# Patient Record
Sex: FEMALE | Race: WHITE | NOT HISPANIC OR LATINO | Employment: FULL TIME | URBAN - METROPOLITAN AREA
[De-identification: names, ages, dates, MRNs, and addresses within clinical notes are randomized per-mention and may not be internally consistent; named-entity substitution may affect disease eponyms.]

---

## 2017-06-29 ENCOUNTER — TRANSCRIBE ORDERS (OUTPATIENT)
Dept: ADMINISTRATIVE | Facility: HOSPITAL | Age: 62
End: 2017-06-29

## 2017-06-29 DIAGNOSIS — R60.9 EDEMA, UNSPECIFIED TYPE: ICD-10-CM

## 2017-06-29 DIAGNOSIS — M79.672 LEFT FOOT PAIN: Primary | ICD-10-CM

## 2017-07-05 ENCOUNTER — HOSPITAL ENCOUNTER (OUTPATIENT)
Dept: RADIOLOGY | Age: 62
Discharge: HOME | End: 2017-07-05
Payer: COMMERCIAL

## 2017-07-05 DIAGNOSIS — R60.9 EDEMA, UNSPECIFIED TYPE: ICD-10-CM

## 2017-07-05 DIAGNOSIS — M79.672 LEFT FOOT PAIN: ICD-10-CM

## 2017-07-05 PROCEDURE — 73718 MRI LOWER EXTREMITY W/O DYE: CPT

## 2017-11-16 ENCOUNTER — TRANSCRIBE ORDERS (OUTPATIENT)
Dept: ADMINISTRATIVE | Facility: HOSPITAL | Age: 62
End: 2017-11-16

## 2017-11-16 DIAGNOSIS — M85.80 OSTEOPENIA, UNSPECIFIED LOCATION: Primary | ICD-10-CM

## 2017-11-16 DIAGNOSIS — Z12.39 SCREENING BREAST EXAMINATION: Primary | ICD-10-CM

## 2017-11-20 ENCOUNTER — HOSPITAL ENCOUNTER (OUTPATIENT)
Dept: RADIOLOGY | Age: 62
Discharge: HOME | End: 2017-11-20
Payer: COMMERCIAL

## 2017-11-20 DIAGNOSIS — Z12.39 SCREENING BREAST EXAMINATION: ICD-10-CM

## 2017-11-20 PROCEDURE — G0202 SCR MAMMO BI INCL CAD: HCPCS

## 2017-12-02 ENCOUNTER — GENERIC CONVERSION - ENCOUNTER (OUTPATIENT)
Dept: OTHER | Facility: OTHER | Age: 62
End: 2017-12-02

## 2017-12-02 ENCOUNTER — HOSPITAL ENCOUNTER (OUTPATIENT)
Dept: RADIOLOGY | Age: 62
Discharge: HOME | End: 2017-12-02
Payer: COMMERCIAL

## 2017-12-02 DIAGNOSIS — M85.80 OSTEOPENIA, UNSPECIFIED LOCATION: ICD-10-CM

## 2017-12-02 PROCEDURE — 77080 DXA BONE DENSITY AXIAL: CPT

## 2021-04-08 DIAGNOSIS — Z23 ENCOUNTER FOR IMMUNIZATION: ICD-10-CM

## 2022-02-13 ENCOUNTER — HOSPITAL ENCOUNTER (INPATIENT)
Facility: HOSPITAL | Age: 67
LOS: 5 days | Discharge: HOME | DRG: 872 | End: 2022-02-18
Attending: EMERGENCY MEDICINE | Admitting: HOSPITALIST
Payer: COMMERCIAL

## 2022-02-13 ENCOUNTER — APPOINTMENT (EMERGENCY)
Dept: CT IMAGING | Facility: HOSPITAL | Age: 67
DRG: 872 | End: 2022-02-13
Payer: COMMERCIAL

## 2022-02-13 DIAGNOSIS — K51.00 PANCOLITIS (HCC): Primary | ICD-10-CM

## 2022-02-13 PROBLEM — A41.9 SEPSIS (HCC): Status: ACTIVE | Noted: 2022-02-13

## 2022-02-13 PROBLEM — F41.9 ANXIETY: Chronic | Status: ACTIVE | Noted: 2022-02-13

## 2022-02-13 PROBLEM — I10 PRIMARY HYPERTENSION: Chronic | Status: ACTIVE | Noted: 2022-02-13

## 2022-02-13 PROBLEM — K52.9 COLITIS: Status: ACTIVE | Noted: 2022-02-13

## 2022-02-13 PROBLEM — E03.9 ACQUIRED HYPOTHYROIDISM: Chronic | Status: ACTIVE | Noted: 2022-02-13

## 2022-02-13 LAB
ALBUMIN SERPL BCP-MCNC: 4.3 G/DL (ref 3.5–5)
ALP SERPL-CCNC: 102 U/L (ref 46–116)
ALT SERPL W P-5'-P-CCNC: 21 U/L (ref 12–78)
ANION GAP SERPL CALCULATED.3IONS-SCNC: 13 MMOL/L (ref 4–13)
APTT PPP: 30 SECONDS (ref 23–37)
AST SERPL W P-5'-P-CCNC: 24 U/L (ref 5–45)
ATRIAL RATE: 60 BPM
ATRIAL RATE: 62 BPM
BASOPHILS # BLD MANUAL: 0 THOUSAND/UL (ref 0–0.1)
BASOPHILS NFR MAR MANUAL: 0 % (ref 0–1)
BILIRUB SERPL-MCNC: 0.69 MG/DL (ref 0.2–1)
BILIRUB UR QL STRIP: NEGATIVE
BUN SERPL-MCNC: 19 MG/DL (ref 5–25)
CALCIUM SERPL-MCNC: 10 MG/DL (ref 8.3–10.1)
CHLORIDE SERPL-SCNC: 101 MMOL/L (ref 100–108)
CLARITY UR: CLEAR
CO2 SERPL-SCNC: 21 MMOL/L (ref 21–32)
COLOR UR: YELLOW
CREAT SERPL-MCNC: 0.71 MG/DL (ref 0.6–1.3)
EOSINOPHIL # BLD MANUAL: 0.18 THOUSAND/UL (ref 0–0.4)
EOSINOPHIL NFR BLD MANUAL: 1 % (ref 0–6)
ERYTHROCYTE [DISTWIDTH] IN BLOOD BY AUTOMATED COUNT: 12.7 % (ref 11.6–15.1)
GFR SERPL CREATININE-BSD FRML MDRD: 89 ML/MIN/1.73SQ M
GLUCOSE SERPL-MCNC: 191 MG/DL (ref 65–140)
GLUCOSE UR STRIP-MCNC: ABNORMAL MG/DL
HCT VFR BLD AUTO: 51.4 % (ref 34.8–46.1)
HGB BLD-MCNC: 17.1 G/DL (ref 11.5–15.4)
HGB UR QL STRIP.AUTO: NEGATIVE
INR PPP: 0.85 (ref 0.84–1.19)
KETONES UR STRIP-MCNC: ABNORMAL MG/DL
LACTATE SERPL-SCNC: 3.2 MMOL/L (ref 0.5–2)
LACTATE SERPL-SCNC: 3.3 MMOL/L (ref 0.5–2)
LACTATE SERPL-SCNC: 3.7 MMOL/L (ref 0.5–2)
LACTATE SERPL-SCNC: 4.3 MMOL/L (ref 0.5–2)
LACTATE SERPL-SCNC: 4.5 MMOL/L (ref 0.5–2)
LEUKOCYTE ESTERASE UR QL STRIP: NEGATIVE
LYMPHOCYTES # BLD AUTO: 22 % (ref 14–44)
LYMPHOCYTES # BLD AUTO: 3.9 THOUSAND/UL (ref 0.6–4.47)
MCH RBC QN AUTO: 28.6 PG (ref 26.8–34.3)
MCHC RBC AUTO-ENTMCNC: 33.3 G/DL (ref 31.4–37.4)
MCV RBC AUTO: 86 FL (ref 82–98)
MONOCYTES # BLD AUTO: 0.71 THOUSAND/UL (ref 0–1.22)
MONOCYTES NFR BLD: 4 % (ref 4–12)
NEUTROPHILS # BLD MANUAL: 12.94 THOUSAND/UL (ref 1.85–7.62)
NEUTS SEG NFR BLD AUTO: 73 % (ref 43–75)
NITRITE UR QL STRIP: NEGATIVE
P AXIS: 73 DEGREES
P AXIS: 75 DEGREES
PH UR STRIP.AUTO: 7 [PH]
PLATELET # BLD AUTO: 295 THOUSANDS/UL (ref 149–390)
PLATELET # BLD AUTO: 341 THOUSANDS/UL (ref 149–390)
PLATELET BLD QL SMEAR: ADEQUATE
PMV BLD AUTO: 10.5 FL (ref 8.9–12.7)
PMV BLD AUTO: 9.4 FL (ref 8.9–12.7)
POTASSIUM SERPL-SCNC: 3.9 MMOL/L (ref 3.5–5.3)
PR INTERVAL: 162 MS
PR INTERVAL: 168 MS
PROCALCITONIN SERPL-MCNC: <0.05 NG/ML
PROT SERPL-MCNC: 7.6 G/DL (ref 6.4–8.2)
PROT UR STRIP-MCNC: NEGATIVE MG/DL
PROTHROMBIN TIME: 11.7 SECONDS (ref 11.6–14.5)
QRS AXIS: 28 DEGREES
QRS AXIS: 51 DEGREES
QRSD INTERVAL: 82 MS
QRSD INTERVAL: 94 MS
QT INTERVAL: 464 MS
QT INTERVAL: 520 MS
QTC INTERVAL: 464 MS
QTC INTERVAL: 529 MS
RBC # BLD AUTO: 5.97 MILLION/UL (ref 3.81–5.12)
RBC MORPH BLD: NORMAL
SODIUM SERPL-SCNC: 135 MMOL/L (ref 136–145)
SP GR UR STRIP.AUTO: 1.01 (ref 1–1.03)
T WAVE AXIS: 11 DEGREES
T WAVE AXIS: 96 DEGREES
T3FREE SERPL-MCNC: 2.35 PG/ML (ref 2.3–4.2)
T4 FREE SERPL-MCNC: 1.08 NG/DL (ref 0.76–1.46)
TSH SERPL DL<=0.05 MIU/L-ACNC: 7.34 UIU/ML (ref 0.36–3.74)
UROBILINOGEN UR QL STRIP.AUTO: 0.2 E.U./DL
VENTRICULAR RATE: 60 BPM
VENTRICULAR RATE: 62 BPM
WBC # BLD AUTO: 17.72 THOUSAND/UL (ref 4.31–10.16)

## 2022-02-13 PROCEDURE — 80053 COMPREHEN METABOLIC PANEL: CPT

## 2022-02-13 PROCEDURE — 85027 COMPLETE CBC AUTOMATED: CPT

## 2022-02-13 PROCEDURE — 84443 ASSAY THYROID STIM HORMONE: CPT

## 2022-02-13 PROCEDURE — 83605 ASSAY OF LACTIC ACID: CPT | Performed by: PHYSICIAN ASSISTANT

## 2022-02-13 PROCEDURE — 85007 BL SMEAR W/DIFF WBC COUNT: CPT

## 2022-02-13 PROCEDURE — 83605 ASSAY OF LACTIC ACID: CPT | Performed by: INTERNAL MEDICINE

## 2022-02-13 PROCEDURE — G1004 CDSM NDSC: HCPCS

## 2022-02-13 PROCEDURE — 84439 ASSAY OF FREE THYROXINE: CPT

## 2022-02-13 PROCEDURE — 36415 COLL VENOUS BLD VENIPUNCTURE: CPT

## 2022-02-13 PROCEDURE — 96365 THER/PROPH/DIAG IV INF INIT: CPT

## 2022-02-13 PROCEDURE — 83605 ASSAY OF LACTIC ACID: CPT

## 2022-02-13 PROCEDURE — 87493 C DIFF AMPLIFIED PROBE: CPT | Performed by: INTERNAL MEDICINE

## 2022-02-13 PROCEDURE — 93010 ELECTROCARDIOGRAM REPORT: CPT | Performed by: INTERNAL MEDICINE

## 2022-02-13 PROCEDURE — 87040 BLOOD CULTURE FOR BACTERIA: CPT

## 2022-02-13 PROCEDURE — 99285 EMERGENCY DEPT VISIT HI MDM: CPT | Performed by: EMERGENCY MEDICINE

## 2022-02-13 PROCEDURE — 87505 NFCT AGENT DETECTION GI: CPT

## 2022-02-13 PROCEDURE — 85730 THROMBOPLASTIN TIME PARTIAL: CPT

## 2022-02-13 PROCEDURE — 85610 PROTHROMBIN TIME: CPT

## 2022-02-13 PROCEDURE — 99223 1ST HOSP IP/OBS HIGH 75: CPT | Performed by: INTERNAL MEDICINE

## 2022-02-13 PROCEDURE — 96361 HYDRATE IV INFUSION ADD-ON: CPT

## 2022-02-13 PROCEDURE — 99285 EMERGENCY DEPT VISIT HI MDM: CPT

## 2022-02-13 PROCEDURE — 81003 URINALYSIS AUTO W/O SCOPE: CPT

## 2022-02-13 PROCEDURE — 96375 TX/PRO/DX INJ NEW DRUG ADDON: CPT

## 2022-02-13 PROCEDURE — 85049 AUTOMATED PLATELET COUNT: CPT | Performed by: INTERNAL MEDICINE

## 2022-02-13 PROCEDURE — 84145 PROCALCITONIN (PCT): CPT

## 2022-02-13 PROCEDURE — 93005 ELECTROCARDIOGRAM TRACING: CPT

## 2022-02-13 PROCEDURE — 74177 CT ABD & PELVIS W/CONTRAST: CPT

## 2022-02-13 PROCEDURE — 84481 FREE ASSAY (FT-3): CPT | Performed by: INTERNAL MEDICINE

## 2022-02-13 RX ORDER — ASPIRIN 81 MG/1
81 TABLET, CHEWABLE ORAL 3 TIMES WEEKLY
Status: DISCONTINUED | OUTPATIENT
Start: 2022-02-14 | End: 2022-02-18 | Stop reason: HOSPADM

## 2022-02-13 RX ORDER — LANOLIN ALCOHOL/MO/W.PET/CERES
100 CREAM (GRAM) TOPICAL DAILY
Status: DISCONTINUED | OUTPATIENT
Start: 2022-02-13 | End: 2022-02-18 | Stop reason: HOSPADM

## 2022-02-13 RX ORDER — ACETAMINOPHEN 325 MG/1
650 TABLET ORAL EVERY 6 HOURS PRN
Status: DISCONTINUED | OUTPATIENT
Start: 2022-02-13 | End: 2022-02-18 | Stop reason: HOSPADM

## 2022-02-13 RX ORDER — ONDANSETRON 2 MG/ML
4 INJECTION INTRAMUSCULAR; INTRAVENOUS ONCE
Status: COMPLETED | OUTPATIENT
Start: 2022-02-13 | End: 2022-02-13

## 2022-02-13 RX ORDER — LEVOTHYROXINE SODIUM 0.03 MG/1
1 TABLET ORAL DAILY
COMMUNITY
End: 2022-02-18 | Stop reason: HOSPADM

## 2022-02-13 RX ORDER — LORAZEPAM 0.5 MG/1
0.5 TABLET ORAL EVERY 12 HOURS PRN
Status: DISCONTINUED | OUTPATIENT
Start: 2022-02-13 | End: 2022-02-18 | Stop reason: HOSPADM

## 2022-02-13 RX ORDER — THIAMINE MONONITRATE (VIT B1) 100 MG
TABLET ORAL
COMMUNITY

## 2022-02-13 RX ORDER — VENLAFAXINE 100 MG/1
220 TABLET ORAL
COMMUNITY

## 2022-02-13 RX ORDER — UBIDECARENONE 75 MG
CAPSULE ORAL
COMMUNITY

## 2022-02-13 RX ORDER — OMEGA-3S/DHA/EPA/FISH OIL/D3 300MG-1000
400 CAPSULE ORAL DAILY
Status: DISCONTINUED | OUTPATIENT
Start: 2022-02-13 | End: 2022-02-18 | Stop reason: HOSPADM

## 2022-02-13 RX ORDER — LEVOTHYROXINE SODIUM 0.05 MG/1
50 TABLET ORAL DAILY
Status: DISCONTINUED | OUTPATIENT
Start: 2022-02-13 | End: 2022-02-18 | Stop reason: HOSPADM

## 2022-02-13 RX ORDER — VANCOMYCIN HYDROCHLORIDE 1 G/200ML
20 INJECTION, SOLUTION INTRAVENOUS ONCE
Status: COMPLETED | OUTPATIENT
Start: 2022-02-13 | End: 2022-02-13

## 2022-02-13 RX ORDER — MORPHINE SULFATE 4 MG/ML
4 INJECTION, SOLUTION INTRAMUSCULAR; INTRAVENOUS ONCE
Status: COMPLETED | OUTPATIENT
Start: 2022-02-13 | End: 2022-02-13

## 2022-02-13 RX ORDER — LOSARTAN POTASSIUM 25 MG/1
1 TABLET ORAL DAILY
COMMUNITY

## 2022-02-13 RX ORDER — CHOLECALCIFEROL (VITAMIN D3) 125 MCG
100 CAPSULE ORAL DAILY
Status: DISCONTINUED | OUTPATIENT
Start: 2022-02-13 | End: 2022-02-18 | Stop reason: HOSPADM

## 2022-02-13 RX ORDER — LORAZEPAM 0.5 MG/1
TABLET ORAL
COMMUNITY

## 2022-02-13 RX ORDER — ONDANSETRON 2 MG/ML
4 INJECTION INTRAMUSCULAR; INTRAVENOUS EVERY 6 HOURS PRN
Status: DISCONTINUED | OUTPATIENT
Start: 2022-02-13 | End: 2022-02-18 | Stop reason: HOSPADM

## 2022-02-13 RX ORDER — SENNOSIDES 8.6 MG
1 TABLET ORAL
Status: DISCONTINUED | OUTPATIENT
Start: 2022-02-13 | End: 2022-02-18 | Stop reason: HOSPADM

## 2022-02-13 RX ORDER — HYDROMORPHONE HCL/PF 1 MG/ML
0.2 SYRINGE (ML) INJECTION
Status: DISCONTINUED | OUTPATIENT
Start: 2022-02-13 | End: 2022-02-18 | Stop reason: HOSPADM

## 2022-02-13 RX ORDER — SODIUM CHLORIDE, SODIUM GLUCONATE, SODIUM ACETATE, POTASSIUM CHLORIDE, MAGNESIUM CHLORIDE, SODIUM PHOSPHATE, DIBASIC, AND POTASSIUM PHOSPHATE .53; .5; .37; .037; .03; .012; .00082 G/100ML; G/100ML; G/100ML; G/100ML; G/100ML; G/100ML; G/100ML
200 INJECTION, SOLUTION INTRAVENOUS CONTINUOUS
Status: DISCONTINUED | OUTPATIENT
Start: 2022-02-13 | End: 2022-02-17

## 2022-02-13 RX ORDER — UREA 10 %
LOTION (ML) TOPICAL
COMMUNITY

## 2022-02-13 RX ORDER — HYDRALAZINE HYDROCHLORIDE 20 MG/ML
5 INJECTION INTRAMUSCULAR; INTRAVENOUS EVERY 6 HOURS PRN
Status: DISCONTINUED | OUTPATIENT
Start: 2022-02-13 | End: 2022-02-18 | Stop reason: HOSPADM

## 2022-02-13 RX ORDER — LOSARTAN POTASSIUM 25 MG/1
25 TABLET ORAL DAILY
Status: DISCONTINUED | OUTPATIENT
Start: 2022-02-13 | End: 2022-02-14

## 2022-02-13 RX ORDER — BIOTIN 5 MG
TABLET ORAL
COMMUNITY
End: 2022-02-18 | Stop reason: HOSPADM

## 2022-02-13 RX ORDER — DICYCLOMINE HYDROCHLORIDE 10 MG/1
10 CAPSULE ORAL 3 TIMES DAILY PRN
Status: DISCONTINUED | OUTPATIENT
Start: 2022-02-13 | End: 2022-02-18 | Stop reason: HOSPADM

## 2022-02-13 RX ORDER — CALCIUM CARBONATE 500(1250)
1 TABLET ORAL 2 TIMES DAILY WITH MEALS
Status: DISCONTINUED | OUTPATIENT
Start: 2022-02-13 | End: 2022-02-18 | Stop reason: HOSPADM

## 2022-02-13 RX ORDER — GLUCOSAMINE HCL 500 MG
TABLET ORAL
COMMUNITY

## 2022-02-13 RX ORDER — GARLIC EXTRACT 500 MG
1 CAPSULE ORAL DAILY
Status: DISCONTINUED | OUTPATIENT
Start: 2022-02-13 | End: 2022-02-13

## 2022-02-13 RX ORDER — SWAB
SWAB, NON-MEDICATED MISCELLANEOUS
COMMUNITY

## 2022-02-13 RX ADMIN — SODIUM CHLORIDE 1000 ML: 0.9 INJECTION, SOLUTION INTRAVENOUS at 08:29

## 2022-02-13 RX ADMIN — MORPHINE SULFATE 4 MG: 4 INJECTION INTRAVENOUS at 10:56

## 2022-02-13 RX ADMIN — ONDANSETRON 4 MG: 2 INJECTION INTRAMUSCULAR; INTRAVENOUS at 10:56

## 2022-02-13 RX ADMIN — ONDANSETRON 4 MG: 2 INJECTION INTRAMUSCULAR; INTRAVENOUS at 07:54

## 2022-02-13 RX ADMIN — SODIUM CHLORIDE 1000 ML: 0.9 INJECTION, SOLUTION INTRAVENOUS at 07:58

## 2022-02-13 RX ADMIN — PIPERACILLIN AND TAZOBACTAM 3.38 G: 36; 4.5 INJECTION, POWDER, FOR SOLUTION INTRAVENOUS at 08:43

## 2022-02-13 RX ADMIN — PIPERACILLIN AND TAZOBACTAM 3.38 G: 36; 4.5 INJECTION, POWDER, FOR SOLUTION INTRAVENOUS at 23:17

## 2022-02-13 RX ADMIN — PIPERACILLIN AND TAZOBACTAM 3.38 G: 36; 4.5 INJECTION, POWDER, FOR SOLUTION INTRAVENOUS at 16:51

## 2022-02-13 RX ADMIN — IOHEXOL 100 ML: 350 INJECTION, SOLUTION INTRAVENOUS at 08:38

## 2022-02-13 RX ADMIN — SODIUM CHLORIDE, SODIUM GLUCONATE, SODIUM ACETATE, POTASSIUM CHLORIDE, MAGNESIUM CHLORIDE, SODIUM PHOSPHATE, DIBASIC, AND POTASSIUM PHOSPHATE 150 ML/HR: .53; .5; .37; .037; .03; .012; .00082 INJECTION, SOLUTION INTRAVENOUS at 14:45

## 2022-02-13 RX ADMIN — VANCOMYCIN HYDROCHLORIDE 1000 MG: 1 INJECTION, SOLUTION INTRAVENOUS at 08:53

## 2022-02-13 NOTE — ED ATTENDING ATTESTATION
2/13/2022  IYanet MD, saw and evaluated the patient  I have discussed the patient with the resident/non-physician practitioner and agree with the resident's/non-physician practitioner's findings, Plan of Care, and MDM as documented in the resident's/non-physician practitioner's note, except where noted  All available labs and Radiology studies were reviewed  I was present for key portions of any procedure(s) performed by the resident/non-physician practitioner and I was immediately available to provide assistance  At this point I agree with the current assessment done in the Emergency Department  I have conducted an independent evaluation of this patient a history and physical is as follows:  71-year-old female presents to the emergency department from home with abdominal discomfort, nausea and vomiting  Onset approximately 2 hours ago  She awoke from sleep with the symptoms and have felt well upon going to bed last night  She describes intense abdominal cramping maximal in the central and lower area with strong urge to defecate  She has been unable to pass any stool or gas rectally today  She does history of chronic constipation and typically passes only small firm pellets of stool as she did yesterday  She denies having noticed any change in pattern/significant decrease in stool passage over the past few days  She has not appreciated any blood or mucus in the stools  She denies having any fevers or urinary symptoms or history of similar intense discomfort  She denies any other recent illness including respiratory symptoms  No known sick contacts, antibiotic use bad food ingestions or travel  No recentMedication changes  Past medical history significant for hypothyroidism, hypertension and chronic constipation  Remote history of inguinal hernia repair  On exam patient appears extremely uncomfortable  She is curled on her right side with repetitive dry heaves    She appears slightly pale  Mucous membranes are moist   Heart sounds regular with rates in the 60s  Lungs clear to auscultation diffusely  No CVA tenderness  Abdomen is mildly protuberant and tympanic  Slightly hyperactive bowel sounds  Tenderness across the mid and lower abdomen-maximal in the right lower quadrant without guarding or rebound tenderness  Temperature registered low at 94  Anamaria hugger currently in place  Differential diagnosis includes but is not limited to severe constipation with resultant nausea/vomiting (potentially result of hypothyroidism, bowel obstruction, gastritis/early gastroenteritis, colitis, diverticulitis, less likely ACS or urinary pathology  ED Course     Temperature remains low after having had Anamaria Hugger in place  Dry heaves have become less frequent following ondansetron  She reports ongoing cramping and strong urge to defecate  Rectal examination performed by Dr Guillaume Boykin  Only small amount of stool was appreciated within the vault  This is Hemoccult negative  Anamaria hugger remains in place  Giving additional warm fluids  pressure was initially extremely elevated when patient was in greatest distress/discomfort  This has lowered significantly to 160s over 70s  Will continue to monitor  Leukocytosis appreciated  Meeting sepsis criteria  Initially initiating broad antibiotics in consideration of intra-abdominal infection  Additional labs being sent-lactic acid, procalcitonin, coags and blood cultures        Critical Care Time  Procedures

## 2022-02-13 NOTE — ASSESSMENT & PLAN NOTE
· Patient has history of constipation; yesterday, patient had significant constipation, only had few pellet-like stools  · Today, patient was also found to have hypothermia  · TSH is high  · Check free T4 and free T3  · As per discussion with the patient, she is compliant with her levothyroxine medication and takes it at the right time before breakfast   · With symptoms of hypothyroidism, I increased patient's dose of levothyroxine from 25 to 50 mcg once a day

## 2022-02-13 NOTE — ASSESSMENT & PLAN NOTE
· Present on admission with hypothermia at 94 1° F, leukocytosis at 17,000  Elevated lactic acid level  · Source:  Colitis; possible viral versus bacterial   · On sepsis order set/protocol  · Patient was started on IV Zosyn the emergency room, will continue for now  · Stools were sent for enteric PCR as well as C difficile PCR/EIA  · First procalcitonin is negative  For repeat procalcitonin tomorrow  · Follow-up results of the blood cultures  · If no further evidence of a bacterial infection, consider discontinuing antibiotic  · Will eventually need gastroenterologist referral, most specially for colonoscopy, likely as an outpatient; however, if no significant improvement of patient's GI symptoms/colitis, consider GI consult  · IV fluids  · Monitor lactic acid level every 2 hours, until normal   · Continue Anamaria Hugger as needed for hypothermia

## 2022-02-13 NOTE — ASSESSMENT & PLAN NOTE
· Per discussion with the patient, she is on venlafaxine as well as Ativan bridgette Chau · In our medication list, it is written that patient is on venlafaxine 220 mg, however, no dose such as this; patient's venlafaxine dose was reviewed in Care everywhere to be 225 mg once a day  · Continue these medications

## 2022-02-13 NOTE — H&P
[address] 1955, 73 y o  female MRN: [id number]  Unit/Bed#: ED 12 Encounter: [id number]  Primary Care Provider: Kiara Malik MD   Date and time admitted to hospital: 2/13/2022  7:15 AM    * Sepsis [facility])  Assessment & Plan  · Present on admission with hypothermia at 94 1° F, leukocytosis at 17,000  Elevated lactic acid level  · Source:  Colitis; possible viral versus bacterial   · On sepsis order set/protocol  · Patient was started on IV Zosyn the emergency room, will continue for now  · Stools were sent for enteric PCR as well as C difficile PCR/EIA  · First procalcitonin is negative  For repeat procalcitonin tomorrow  · Follow-up results of the blood cultures  · If no further evidence of a bacterial infection, consider discontinuing antibiotic  · Will eventually need gastroenterologist referral, most specially for colonoscopy, likely as an outpatient; however, if no significant improvement of patient's GI symptoms/colitis, consider GI consult  · IV fluids  · Monitor lactic acid level every 2 hours, until normal   · Continue Anamaria Hugger as needed for hypothermia  Colitis  Assessment & Plan  · History of constipation and had pellet stools yesterday  Patient came in due to abdominal pains at the suprapubic and left lower quadrant areas  Patient had diarrhea and nausea and vomiting here in the emergency room  No fever or chills  However patient was found to be hypothermic in the emergency room  · Viral versus bacterial   Possibility patient's diarrhea in the emergency room, an overflow diarrhea, with patient having history of constipation  · Stools were sent for enteric PCR as well as C difficile PCR/EIA  · Started on IV Zosyn in the emergency room, will continue for now  If no further evidence of a bacterial infection, consider discontinuing antibiotic  · If no improvement, consider GI consult as an inpatient    Otherwise, may have outpatient GI follow-up for eventual colonoscopy  · Pain control p r n  [place] Patient was also ordered with Bentyl p r n  For abdominal cramping  · Please see plans under sepsis  Primary hypertension  Assessment & Plan  · Blood pressure in the emergency room, initially high, but has gone down spontaneously  · Continue blood pressure medication  · Patient has not taken her blood pressure medication yet today, thus we will start today  · IV blood pressure medication on as needed basis  Acquired hypothyroidism  Assessment & Plan  · Patient has history of constipation; yesterday, patient had significant constipation, only had few pellet-like stools  · Today, patient was also found to have hypothermia  · TSH is high  · Check free T4 and free T3  · As per discussion with the patient, she is compliant with her levothyroxine medication and takes it at the right time before breakfast   · With symptoms of hypothyroidism, I increased patient's dose of levothyroxine from 25 to 50 mcg once a day  Anxiety  Assessment & Plan  · Per discussion with the patient, she is on venlafaxine as well as Ativan p r n  [place] · In our medication list, it is written that patient is on venlafaxine 220 mg, however, no dose such as this; patient's venlafaxine dose was reviewed in Care everywhere to be 225 mg once a day  · Continue these medications  VTE Pharmacologic Prophylaxis: VTE Score: 4 Moderate Risk (Score 3-4) - Pharmacological DVT Prophylaxis Ordered: enoxaparin (Lovenox)  Code Status:   Discussion with family: Updated  () at bedside  Anticipated Length of Stay: Patient will be admitted on an inpatient basis with an anticipated length of stay of greater than 2 midnights secondary to Above findings and plans       Total Time for Visit, including Counseling / Coordination of Care: 70 minutes Greater than 50% of this total time spent on direct patient counseling and coordination of care      Chief Complaint:  Abdominal pain    History of Present Illness:  Avery De Los Santos is a 73 y o  female with a PMH of hypothyroidism, constipation, hypertension and anxiety who presents with abdominal pain  According to the patient, she started having abdominal pain this morning  Patient described the pain to be excruciating, constant and cramping in character  Patient localized the pain more on the left lower quadrant as well as suprapubic area  Patient told me that she has history of constipation and yesterday, she only had a few pellet-like stools  While in the emergency room, patient had an episode of nausea and vomiting and also had diarrhea  I saw patient's stools and it was watery, brownish and nonbloody  When asked about her vomit, patient told me she did not look at it  Patient denies any fever or chills, however, in the emergency room, patient was found out to have hypothermia  According to the patient, yesterday, she had Richelle Caesar salad with chicken and also had guacamole with some wine  Patient has not eaten yet today  Patient denies any other symptoms other the ones mentioned above  Review of Systems:  Review of Systems     Ten point review systems done and they were negative except for the ones I mentioned in my history of present illness  Patient denies any headaches or any dizziness or loss of consciousness  Patient denies any cough or colds  Patient denies any chest pains or shortness of breath  Patient denies any urinary symptoms  For the other symptoms, please see my HPI  Past Medical and Surgical History:     · Per discussion with the patient, she has history of hypothyroidism, anxiety, hypertension, constipation and asthma  · Patient has history of vein stripping; right inguinal surgery; and colonoscopy  Meds/Allergies:  Prior to Admission medications    Medication Sig Start Date End Date Taking?  Authorizing Provider   ASPIRIN 81 PO Take by mouth   Yes Historical Provider, MD B Complex-C (B COMPLEX-VITAMIN C PO) Take by mouth   Yes Historical Provider, MD   Calcium Carbonate (Calcium 600) 1500 (600 Ca) MG TABS Take by mouth   Yes Historical Provider, MD   calcium carbonate (OS-KONSTANTIN) 1250 (500 Ca) MG chewable tablet Chew   Yes Historical Provider, MD   Coenzyme Q10 100 MG TABS Take by mouth   Yes Historical Provider, MD   cyanocobalamin (VITAMIN B-12) 100 mcg tablet Take by mouth   Yes Historical Provider, MD   Garlic 297 MG TABS Take by mouth   Yes Historical Provider, MD   Grape Seed 50 MG TABS Take by mouth   Yes Historical Provider, MD Lai [place] Oil 1000 MG CAPS Take by mouth   Yes Historical Provider, MD   levothyroxine (Synthroid) 25 mcg tablet Take 1 tablet by mouth daily   Yes Historical Provider, MD   LORazepam (ATIVAN) 0 5 mg tablet Take by mouth   Yes Historical Provider, MD   losartan (Cozaar) 25 mg tablet Take 1 tablet by mouth daily   Yes Historical Provider, MD   thiamine (VITAMIN B1) 100 mg tablet Take by mouth   Yes Historical Provider, MD   venlafaxine (EFFEXOR) 100 MG tablet Take 220 mg by mouth   Yes Historical Provider, MD   Vitamin D, Cholecalciferol, 10 MCG (400 UNIT) CAPS Take by mouth   Yes Historical Provider, MD     Medication list was reviewed; I also asked the patient about some of her medications; I also look up patient's venlafaxine dose at Care everywhere        Allergies: Allergies   Allergen Reactions    Codeine Anxiety       Social History:  Marital Status: /Civil Union     Social History     Substance and Sexual Activity   Alcohol Use Yes    Alcohol/week: 3 0 standard drinks    Types: 3 Glasses of wine per week     Social History     Tobacco Use   Smoking Status Never Smoker   Smokeless Tobacco Never Used     Social History     Substance and Sexual Activity   Drug Use Not on file       Family History:    History of bladder cancer: Mother; hypertension, mother and father; heart disease, father; hypothyroidism, mother and brother      Physical Exam:     Vitals:   Blood Pressure: 144/88 (02/13/22 1045)  Pulse: 90 (02/13/22 1045)  Temperature: (!) 95 4 °F (35 2 °C) (02/13/22 1045)  Temp Source: Rectal (02/13/22 1045)  Respirations: 18 (02/13/22 1045)  Height: 4' 11" (149 9 cm) (02/13/22 0719)  Weight - Scale: 47 6 kg (105 lb) (02/13/22 0719)  SpO2: 100 % (02/13/22 1045)    Physical Exam  Vitals and nursing note reviewed  Constitutional:       General: She is not in acute distress  Appearance: She is ill-appearing  She is not toxic-appearing or diaphoretic  HENT:      Head: Normocephalic and atraumatic  Mouth/Throat:      Mouth: Mucous membranes are dry  Pharynx: Oropharynx is clear  No oropharyngeal exudate or posterior oropharyngeal erythema  Eyes:      General: No scleral icterus  Right eye: No discharge  Left eye: No discharge  Cardiovascular:      Rate and Rhythm: Normal rate and regular rhythm  Heart sounds: Normal heart sounds  No murmur heard  No friction rub  No gallop  Pulmonary:      Effort: Pulmonary effort is normal  No respiratory distress  Breath sounds: Normal breath sounds  No stridor  No wheezing, rhonchi or rales  Abdominal:      General: Bowel sounds are normal  There is no distension  Palpations: Abdomen is soft  There is no mass  Tenderness: There is abdominal tenderness  There is no guarding or rebound  Hernia: No hernia is present  Comments: Positive for direct tenderness at the left lower quadrant as well as suprapubic area  Musculoskeletal:      Right lower leg: No edema  Left lower leg: No edema  Skin:     General: Skin is warm and dry  Coloration: Skin is not pale  Findings: No erythema or rash  Neurological:      General: No focal deficit present  Mental Status: She is alert and oriented to person, place, and time  Psychiatric:         Behavior: Behavior normal          Thought Content:  Thought content normal       Comments: Patient is anxious  Additional Data:     Lab Results:  Results from last 7 days   Lab Units 02/13/22  0754   WBC Thousand/uL 17 72*   HEMOGLOBIN g/dL 17 1*   HEMATOCRIT % 51 4*   PLATELETS Thousands/uL 295   LYMPHO PCT % 22   MONO PCT % 4   EOS PCT % 1     Results from last 7 days   Lab Units 02/13/22  0754   SODIUM mmol/L 135*   POTASSIUM mmol/L 3 9   CHLORIDE mmol/L 101   CO2 mmol/L 21   BUN mg/dL 19   CREATININE mg/dL 0 71   ANION GAP mmol/L 13   CALCIUM mg/dL 10 0   ALBUMIN g/dL 4 3   TOTAL BILIRUBIN mg/dL 0 69   ALK PHOS U/L 102   ALT U/L 21   AST U/L 24   GLUCOSE RANDOM mg/dL 191*     Results from last 7 days   Lab Units 02/13/22  0827   INR  0 85             Results from last 7 days   Lab Units 02/13/22  1052 02/13/22  0827 02/13/22  0822   LACTIC ACID mmol/L 3 2*  --  3 7*   PROCALCITONIN ng/ml  --  <0 05  --        Imaging: Reviewed radiology reports from this admission including: abdominal/pelvic CT  CT abdomen pelvis with contrast   Final Result by Jaime Foster MD (02/13 4628)      Pancolitis, most likely infectious  The descending and sigmoid colon are most severely affected with marked adjacent mesenteric edema  No pneumatosis identified  Workstation performed: [id number]             EKG and Other Studies Reviewed on Admission:   · EKG: NSR  HR Sixty per minute  ** Please Note: This note has been constructed using a voice recognition system   **

## 2022-02-13 NOTE — ASSESSMENT & PLAN NOTE
· Blood pressure in the emergency room, initially high, but has gone down spontaneously  · Continue blood pressure medication  · Patient has not taken her blood pressure medication yet today, thus we will start today  · IV blood pressure medication on as needed basis

## 2022-02-13 NOTE — ED PROVIDER NOTES
History  Chief Complaint   Patient presents with    Abdominal Pain     Right sided abdominal pain that started this AM  Per pt, "I feel like I have to have a BM " Found in bathroom by  who called EMS  Per pt, "I drank too much wine last night " Pt reports drinking 3 glasses of wine last night  Tatiana Fleming is a 29-year-old female with history of hypothyroidism, hypertension, anxiety presenting with a chief complaint of abdominal pain  She notes she has a longstanding history with constipation but does not use any aids to have a bowel movement  Last evening she had a few drinks and this morning woke up with abdominal pain  She states her abdominal pain is crampy in nature and has associated nausea and vomiting  She also notes feels like she has to have a bowel movement  She since her pain is most concentrated suprapubically  She denies dysuria, vaginal bleeding, vaginal discharge, hematochezia, melena  She is otherwise well and without complaint  Prior to Admission Medications   Prescriptions Last Dose Informant Patient Reported? Taking?    ASPIRIN 81 PO   Yes Yes   Sig: Take by mouth   B Complex-C (B COMPLEX-VITAMIN C PO)   Yes Yes   Sig: Take by mouth   Calcium Carbonate (Calcium 600) 1500 (600 Ca) MG TABS   Yes Yes   Sig: Take by mouth   Coenzyme Q10 100 MG TABS   Yes Yes   Sig: Take by mouth   Garlic 233 MG TABS   Yes Yes   Sig: Take by mouth   Grape Seed 50 MG TABS   Yes Yes   Sig: Take by mouth   Krill Oil 1000 MG CAPS   Yes Yes   Sig: Take by mouth   LORazepam (ATIVAN) 0 5 mg tablet   Yes Yes   Sig: Take by mouth   Vitamin D, Cholecalciferol, 10 MCG (400 UNIT) CAPS   Yes Yes   Sig: Take by mouth   calcium carbonate (OS-KONSTANTIN) 1250 (500 Ca) MG chewable tablet   Yes Yes   Sig: Chew   cyanocobalamin (VITAMIN B-12) 100 mcg tablet   Yes Yes   Sig: Take by mouth   levothyroxine (Synthroid) 25 mcg tablet   Yes Yes   Sig: Take 1 tablet by mouth daily   losartan (Cozaar) 25 mg tablet   Yes Yes   Sig: Take 1 tablet by mouth daily   thiamine (VITAMIN B1) 100 mg tablet   Yes Yes   Sig: Take by mouth   venlafaxine (EFFEXOR) 100 MG tablet   Yes Yes   Sig: Take 220 mg by mouth      Facility-Administered Medications: None       Past Medical History:   Diagnosis Date    Asthma     Disease of thyroid gland     Hypertension     Psychiatric disorder        Past Surgical History:   Procedure Laterality Date    INGUINAL HERNIA REPAIR  2002    VASCULAR SURGERY      Vein removed from Left leg       History reviewed  No pertinent family history  I have reviewed and agree with the history as documented  E-Cigarette/Vaping     E-Cigarette/Vaping Substances     Social History     Tobacco Use    Smoking status: Never Smoker    Smokeless tobacco: Never Used   Substance Use Topics    Alcohol use: Yes     Alcohol/week: 3 0 standard drinks     Types: 3 Glasses of wine per week    Drug use: Not on file        Review of Systems   Constitutional: Negative  HENT: Negative  Eyes: Negative  Respiratory: Negative  Cardiovascular: Negative  Gastrointestinal: Positive for abdominal pain and constipation  Endocrine: Negative  Genitourinary: Negative  Musculoskeletal: Negative  Skin: Negative  Allergic/Immunologic: Negative  Neurological: Negative  Hematological: Negative  Psychiatric/Behavioral: Negative  All other systems reviewed and are negative        Physical Exam  ED Triage Vitals   Temperature Pulse Respirations Blood Pressure SpO2   02/13/22 0731 02/13/22 0719 02/13/22 0719 02/13/22 0719 02/13/22 0719   (!) 94 1 °F (34 5 °C) 65 18 (!) 197/117 100 %      Temp Source Heart Rate Source Patient Position - Orthostatic VS BP Location FiO2 (%)   02/13/22 0731 02/13/22 0719 02/13/22 0719 02/13/22 0719 --   Rectal Monitor Lying Right arm       Pain Score       02/13/22 0719       10 - Worst Possible Pain             Orthostatic Vital Signs  Vitals:    02/16/22 1509 02/16/22 2227 02/17/22 0700 02/17/22 1543   BP: 142/73 138/65 157/85 159/75   Pulse: 72 62 79 79   Patient Position - Orthostatic VS: Lying Lying Lying Lying       Physical Exam  Vitals and nursing note reviewed  Constitutional:       General: She is in acute distress  Appearance: Normal appearance  She is ill-appearing  She is not toxic-appearing or diaphoretic  HENT:      Head: Normocephalic and atraumatic  Eyes:      General: No scleral icterus  Right eye: No discharge  Left eye: No discharge  Extraocular Movements: Extraocular movements intact  Conjunctiva/sclera: Conjunctivae normal       Pupils: Pupils are equal, round, and reactive to light  Cardiovascular:      Rate and Rhythm: Normal rate  Pulses: Normal pulses  Heart sounds: Normal heart sounds  No murmur heard  No friction rub  No gallop  Pulmonary:      Effort: Pulmonary effort is normal  No respiratory distress  Breath sounds: Normal breath sounds  No stridor  No wheezing, rhonchi or rales  Abdominal:      General: Abdomen is flat  Bowel sounds are normal  There is no distension  Palpations: Abdomen is soft  Tenderness: There is abdominal tenderness  There is no guarding or rebound  Comments: Patient repeatedly dry-heaving during examination adding to her distress   Musculoskeletal:         General: No swelling  Normal range of motion  Cervical back: Normal range of motion  No rigidity  Right lower leg: No edema  Left lower leg: No edema  Skin:     General: Skin is warm and dry  Capillary Refill: Capillary refill takes less than 2 seconds  Coloration: Skin is not jaundiced  Findings: No bruising or lesion  Neurological:      General: No focal deficit present  Mental Status: She is alert and oriented to person, place, and time  Mental status is at baseline     Psychiatric:         Mood and Affect: Mood normal          Behavior: Behavior normal          Thought Content: Thought content normal          Judgment: Judgment normal          ED Medications  Medications   aspirin chewable tablet 81 mg ( Oral Canceled Entry 2/16/22 0900)   co-enzyme Q-10 capsule 100 mg (100 mg Oral Refused 2/17/22 0843)   cyanocobalamin (VITAMIN B-12) tablet 100 mcg (100 mcg Oral Refused 2/17/22 0844)   levothyroxine tablet 50 mcg (50 mcg Oral Given 2/17/22 0846)   thiamine tablet 100 mg (100 mg Oral Refused 2/17/22 0844)   cholecalciferol (VITAMIN D3) tablet 400 Units (400 Units Oral Refused 2/17/22 0842)   calcium carbonate (OYSTER SHELL,OSCAL) 500 mg tablet 1 tablet (1 tablet Oral Refused 2/17/22 1630)   LORazepam (ATIVAN) tablet 0 5 mg (has no administration in time range)   piperacillin-tazobactam (ZOSYN) 3 375 g in sodium chloride 0 9 % 100 mL IVPB ( Intravenous Restarted 2/17/22 1834)   acetaminophen (TYLENOL) tablet 650 mg (has no administration in time range)   ondansetron (ZOFRAN) injection 4 mg (has no administration in time range)   senna (SENOKOT) tablet 8 6 mg (has no administration in time range)   hydrALAZINE (APRESOLINE) injection 5 mg (5 mg Intravenous Given 2/15/22 1606)   dicyclomine (BENTYL) capsule 10 mg (has no administration in time range)   HYDROmorphone (DILAUDID) injection 0 2 mg (0 2 mg Intravenous Given 2/14/22 5892)   multivitamin stress formula tablet 1 tablet (1 tablet Oral Refused 2/17/22 0844)   heparin (porcine) subcutaneous injection 5,000 Units (5,000 Units Subcutaneous Given 2/17/22 1426)   venlafaxine (EFFEXOR-XR) 24 hr capsule 225 mg (225 mg Oral Given 2/17/22 0950)   ondansetron (ZOFRAN) injection 4 mg (4 mg Intravenous Given 2/13/22 0754)   sodium chloride 0 9 % bolus 1,000 mL (0 mL Intravenous Stopped 2/13/22 0828)   vancomycin (VANCOCIN) IVPB (premix in dextrose) 1,000 mg 200 mL (0 mg/kg × 47 6 kg Intravenous Stopped 2/13/22 0953)   piperacillin-tazobactam (ZOSYN) 3 375 g in sodium chloride 0 9 % 100 mL IVPB (0 g Intravenous Stopped 2/13/22 0853)   sodium chloride 0 9 % bolus 1,000 mL (0 mL Intravenous Stopped 2/13/22 0929)   iohexol (OMNIPAQUE) 350 MG/ML injection (SINGLE-DOSE) 100 mL (100 mL Intravenous Given 2/13/22 0838)   ondansetron (ZOFRAN) injection 4 mg (4 mg Intravenous Given 2/13/22 1056)   morphine (PF) 4 mg/mL injection 4 mg (4 mg Intravenous Given 2/13/22 1056)   potassium chloride 20 mEq IVPB (premix) (0 mEq Intravenous Stopped 2/15/22 1758)   potassium chloride 20 mEq IVPB (premix) (20 mEq Intravenous New Bag 2/16/22 1014)   potassium chloride (K-DUR,KLOR-CON) CR tablet 40 mEq (40 mEq Oral Given 2/17/22 1049)   potassium chloride 20 mEq IVPB (premix) (20 mEq Intravenous New Bag 2/17/22 1131)       Diagnostic Studies  Results Reviewed     Procedure Component Value Units Date/Time    Blood culture #1 [[id number]] Collected: 02/13/22 3408    Lab Status: Preliminary result Specimen: Blood from Arm, Left Updated: 02/17/22 1201     Blood Culture No Growth After 4 Days  Blood culture #2 [[id number]] Collected: 02/13/22 6978    Lab Status: Preliminary result Specimen: Blood from Arm, Right Updated: 02/17/22 1201     Blood Culture No Growth After 4 Days      Stool Enteric Bacterial Panel by PCR [[id number]]  (Normal) Collected: 02/13/22 1047    Lab Status: Final result Specimen: Stool from Rectum Updated: 02/14/22 1122     Salmonella sp PCR None Detected     Shigella sp/Enteroinvasive E  coli (EIEC) PCR None Detected     Campylobacter sp (jejuni and coli) PCR None Detected     Shiga toxin 1/Shiga toxin 2 genes PCR None Detected    Clostridium difficile toxin by PCR with EIA [[id number]]  (Normal) Collected: 02/13/22 1311    Lab Status: Final result Specimen: Stool from Per Rectum Updated: 02/14/22 1103     C difficile toxin by PCR Negative    Procalcitonin Reflex [[id number]]  (Abnormal) Collected: 02/14/22 0500    Lab Status: Final result Specimen: Blood Updated: 02/14/22 0656     Procalcitonin 36 79 ng/ml     Basic metabolic panel [[id number]]  (Abnormal) Collected: 02/14/22 0417    Lab Status: Final result Specimen: Blood from Arm, Right Updated: 02/14/22 0509     Sodium 136 mmol/L      Potassium 4 8 mmol/L      Chloride 102 mmol/L      CO2 21 mmol/L      ANION GAP 13 mmol/L      BUN 28 mg/dL      Creatinine 1 48 mg/dL      Glucose 160 mg/dL      Calcium 8 5 mg/dL      eGFR 36 ml/min/1 73sq m     Narrative:      [place] guidelines for Chronic Kidney Disease (CKD):     Stage 1 with normal or high GFR (GFR > 90 mL/min/1 73 square meters)    Stage 2 Mild CKD (GFR = 60-89 mL/min/1 73 square meters)    Stage 3A Moderate CKD (GFR = 45-59 mL/min/1 73 square meters)    Stage 3B Moderate CKD (GFR = 30-44 mL/min/1 73 square meters)    Stage 4 Severe CKD (GFR = 15-29 mL/min/1 73 square meters)    Stage 5 End Stage CKD (GFR <15 mL/min/1 73 square meters)  Note: GFR calculation is accurate only with a steady state creatinine    CBC and differential [[id number]]  (Abnormal) Collected: 02/14/22 0417    Lab Status: Final result Specimen: Blood from Arm, Right Updated: 02/14/22 0451     WBC 16 91 Thousand/uL      RBC 6 10 Million/uL      Hemoglobin 17 6 g/dL      Hematocrit 53 8 %      MCV 88 fL      MCH 28 9 pg      MCHC 32 7 g/dL      RDW 13 2 %      MPV 9 3 fL      Platelets 668 Thousands/uL      nRBC 0 /100 WBCs      Neutrophils Relative 65 %      Immat GRANS % 1 %      Lymphocytes Relative 29 %      Monocytes Relative 5 %      Eosinophils Relative 0 %      Basophils Relative 0 %      Neutrophils Absolute 11 05 Thousands/µL      Immature Grans Absolute 0 12 Thousand/uL      Lymphocytes Absolute 4 84 Thousands/µL      Monocytes Absolute 0 88 Thousand/µL      Eosinophils Absolute 0 00 Thousand/µL      Basophils Absolute 0 02 Thousands/µL     Narrative:      See Manual Diff Done Within 3 Days  This is an appended report  These results have been appended to a previously verified report      Magnesium [[id number]]  (Normal) Collected: 02/14/22 0417 Lab Status: Final result Specimen: Blood from Arm, Right Updated: 02/14/22 0444     Magnesium 2 4 mg/dL     Phosphorus [[id number]]  (Abnormal) Collected: 02/14/22 0417    Lab Status: Final result Specimen: Blood from Arm, Right Updated: 02/14/22 0444     Phosphorus 6 6 mg/dL     Lactic acid 2 Hours [[id number]]  (Abnormal) Collected: 02/14/22 0305    Lab Status: Final result Specimen: Blood from Arm, Right Updated: 02/14/22 0341     LACTIC ACID 3 3 mmol/L     Narrative:      Result may be elevated if tourniquet was used during collection  Lactic acid 2 Hours [[id number]]  (Abnormal) Collected: 02/14/22 0108    Lab Status: Final result Specimen: Blood from Arm, Right Updated: 02/14/22 0159     LACTIC ACID 4 3 mmol/L     Narrative:      Result may be elevated if tourniquet was used during collection  Lactic acid, plasma [[id number]]  (Abnormal) Collected: 02/13/22 2310    Lab Status: Final result Specimen: Blood from Arm, Right Updated: 02/14/22 0004     LACTIC ACID 3 7 mmol/L     Narrative:      Result may be elevated if tourniquet was used during collection  T4, free [id number]  (Normal) Collected: 02/13/22 0754    Lab Status: Final result Specimen: Blood from Arm, Right Updated: 02/13/22 2326     Free T4 1 08 ng/dL     T3, free [[id number]]  (Normal) Collected: 02/13/22 0754    Lab Status: Final result Specimen: Blood from Arm, Right Updated: 02/13/22 2308     T3, Free 2 35 pg/mL     Lactic Acid with Reflex STAT [[id number]]  (Abnormal) Collected: 02/13/22 2034    Lab Status: Final result Specimen: Blood from Arm, Right Updated: 02/13/22 2117     LACTIC ACID 3 3 mmol/L     Narrative:      Result may be elevated if tourniquet was used during collection      Lactic Acid with Reflex STAT [[id number]]  (Abnormal) Collected: 02/13/22 1652    Lab Status: Final result Specimen: Blood from Arm, Left Updated: 02/13/22 1803     LACTIC ACID 4 5 mmol/L     Narrative:      Result may be elevated if tourniquet was used during collection  Lactic Acid with Reflex STAT [[id number]]  (Abnormal) Collected: 02/13/22 1311    Lab Status: Final result Specimen: Blood from Arm, Right Updated: 02/13/22 1503     LACTIC ACID 4 3 mmol/L     Narrative:      Result may be elevated if tourniquet was used during collection  Platelet count [[id number]]  (Normal) Collected: 02/13/22 1311    Lab Status: Final result Specimen: Blood from Arm, Right Updated: 02/13/22 1324     Platelets 978 Thousands/uL      MPV 9 4 fL     Lactic acid 2 Hours [[id number]]  (Abnormal) Collected: 02/13/22 1052    Lab Status: Final result Specimen: Blood from Arm, Right Updated: 02/13/22 1132     LACTIC ACID 3 2 mmol/L     Narrative:      Result may be elevated if tourniquet was used during collection  Procalcitonin with AM Reflex [[id number]]  (Normal) Collected: 02/13/22 0827    Lab Status: Final result Specimen: Blood from Arm, Right Updated: 02/13/22 0951     Procalcitonin <0 05 ng/ml     Lactic acid [[id number]]  (Abnormal) Collected: 02/13/22 6850    Lab Status: Final result Specimen: Blood from Arm, Right Updated: 02/13/22 0943     LACTIC ACID 3 7 mmol/L     Narrative:      Result may be elevated if tourniquet was used during collection      UA w Reflex to Microscopic w Reflex to Culture [[id number]]  (Abnormal) Collected: 02/13/22 0852    Lab Status: Final result Specimen: Urine, Other Updated: 02/13/22 0859     Color, UA Yellow     Clarity, UA Clear     Specific Gravity, UA 1 015     pH, UA 7 0     Leukocytes, UA Negative     Nitrite, UA Negative     Protein, UA Negative mg/dl      Glucose,  (1/10%) mg/dl      Ketones, UA 15 (1+) mg/dl      Urobilinogen, UA 0 2 E U /dl      Bilirubin, UA Negative     Blood, UA Negative    Protime-INR [[id number]]  (Normal) Collected: 02/13/22 0827    Lab Status: Final result Specimen: Blood from Arm, Right Updated: 02/13/22 0847     Protime 11 7 seconds      INR 0 85    APTT [[id number]]  (Normal) Collected: 02/13/22 0827    Lab Status: Final result Specimen: Blood from Arm, Right Updated: 02/13/22 0847     PTT 30 seconds     TSH, 3rd generation with Free T4 reflex [[id number]]  (Abnormal) Collected: 02/13/22 0754    Lab Status: Final result Specimen: Blood from Arm, Right Updated: 02/13/22 0832     TSH 3RD GENERATON 7 341 uIU/mL     Narrative:      Patients undergoing fluorescein dye angiography may retain small amounts of fluorescein in the body for 48-72 hours post procedure  Samples containing fluorescein can produce falsely depressed TSH values  If the patient had this procedure,a specimen should be resubmitted post fluorescein clearance  CBC and differential [[id number]]  (Abnormal) Collected: 02/13/22 0754    Lab Status: Final result Specimen: Blood from Arm, Right Updated: 02/13/22 0824     WBC 17 72 Thousand/uL      RBC 5 97 Million/uL      Hemoglobin 17 1 g/dL      Hematocrit 51 4 %      MCV 86 fL      MCH 28 6 pg      MCHC 33 3 g/dL      RDW 12 7 %      MPV 10 5 fL      Platelets 238 Thousands/uL     Narrative: This is an appended report  These results have been appended to a previously verified report      Manual Differential(PHLEBS Do Not Order) [[id number]]  (Abnormal) Collected: 02/13/22 0754    Lab Status: Final result Specimen: Blood from Arm, Right Updated: 02/13/22 0824     Segmented % 73 %      Lymphocytes % 22 %      Monocytes % 4 %      Eosinophils, % 1 %      Basophils % 0 %      Absolute Neutrophils 12 94 Thousand/uL      Lymphocytes Absolute 3 90 Thousand/uL      Monocytes Absolute 0 71 Thousand/uL      Eosinophils Absolute 0 18 Thousand/uL      Basophils Absolute 0 00 Thousand/uL      Total Counted --     RBC Morphology Normal     Platelet Estimate Adequate    Comprehensive metabolic panel [[id number]]  (Abnormal) Collected: 02/13/22 0754    Lab Status: Final result Specimen: Blood from Arm, Right Updated: 02/13/22 0823     Sodium 135 mmol/L      Potassium 3 9 mmol/L      Chloride 101 mmol/L      CO2 21 mmol/L ANION GAP 13 mmol/L      BUN 19 mg/dL      Creatinine 0 71 mg/dL      Glucose 191 mg/dL      Calcium 10 0 mg/dL      AST 24 U/L      ALT 21 U/L      Alkaline Phosphatase 102 U/L      Total Protein 7 6 g/dL      Albumin 4 3 g/dL      Total Bilirubin 0 69 mg/dL      eGFR 89 ml/min/1 73sq m     Narrative:      [place] guidelines for Chronic Kidney Disease (CKD):     Stage 1 with normal or high GFR (GFR > 90 mL/min/1 73 square meters)    Stage 2 Mild CKD (GFR = 60-89 mL/min/1 73 square meters)    Stage 3A Moderate CKD (GFR = 45-59 mL/min/1 73 square meters)    Stage 3B Moderate CKD (GFR = 30-44 mL/min/1 73 square meters)    Stage 4 Severe CKD (GFR = 15-29 mL/min/1 73 square meters)    Stage 5 End Stage CKD (GFR <15 mL/min/1 73 square meters)  Note: GFR calculation is accurate only with a steady state creatinine                 CT abdomen pelvis with contrast   Final Result by Fernando Donohue MD (02/13 3810)      Pancolitis, most likely infectious  The descending and sigmoid colon are most severely affected with marked adjacent mesenteric edema  No pneumatosis identified              Workstation performed: [id number]               Procedures  ECG 12 Lead Documentation Only    Date/Time: 2/13/2022 8:54 AM  Performed by: Sam Carlton DO  Authorized by: Sam Carlton DO     Indications / Diagnosis:  Concern for hypothyroidism  ECG reviewed by me, the ED Provider: yes    Patient location:  ED  Previous ECG:     Previous ECG:  Compared to current    Comparison ECG info:  Prolonged QTc when compared to 23-NOV-2015    Comparison to cardiac monitor: Yes    Interpretation:     Interpretation: normal    Rate:     ECG rate:  60    ECG rate assessment: normal    Rhythm:     Rhythm: sinus rhythm    Ectopy:     Ectopy: none    QRS:     QRS axis:  Normal  Conduction:     Conduction: normal    ST segments:     ST segments:  Normal  T waves:     T waves: normal            ED Course                           Default Flowsheet Data (last 720 hours)     Sepsis Reassess     Row Name 02/13/22 1257                   Repeat Volume Status and Tissue Perfusion Assessment Performed    Repeat Volume Status and Tissue Perfusion Assessment Performed Yes  -FM                  Volume Status and Tissue Perfusion Post Fluid Resuscitation * Must Document All *    Vital Signs Reviewed (HR, RR, BP, T) Yes  -FM        Shock Index Reviewed Yes  -FM        Arterial Oxygen Saturation Reviewed (POx, SaO2 or SpO2) Yes (comment %)  95%  -FM        Cardio Normal S1/S2; Regular rate and rhythm; No murmor  -FM        Pulmonary Normal effort;Clear to auscultation  -FM        Capillary Refill Brisk  -FM        Peripheral Pulses Radial  -FM        Peripheral Pulse +2  -FM        Skin Warm  -FM        Urine output assessed Adequate  -FM                  *OR*   Intensive Monitoring- Must Document One of the Following Four *:    Vital Signs Reviewed --        * Central Venous Pressure (CVP or RAP) --        * Central Venous Oxygen (SVO2, ScvO2 or Oxygen saturation via central catheter) --        * Bedside Cardiovascular US in IVC diameter and % collapse --        * Passive Leg Raise OR Crystalloid Challenge --              User Key  (r) = Recorded By, (t) = Taken By, (c) = Cosigned By    Initials Name Provider Type    FM DO Huy Mccracken              SBIRT 22yo+      Most Recent Value   SBIRT (23 yo +)    In order to provide better care to our patients, we are screening all of our patients for alcohol and drug use  Would it be okay to ask you these screening questions? Yes Filed at: 02/13/2022 1380   Initial Alcohol Screen: US AUDIT-C     1  How often do you have a drink containing alcohol? 3 Filed at: 02/13/2022 0724   2  How many drinks containing alcohol do you have on a typical day you are drinking? 2 Filed at: 02/13/2022 0724   3a  Male UNDER 65:  How often do you have five or more drinks on one occasion? 0 Filed at: 02/13/2022 0724   3b  FEMALE Any Age, or MALE 65+: How often do you have 4 or more drinks on one occassion? 0 Filed at: 02/13/2022 0724   Audit-C Score 5 Filed at: 02/13/2022 8323   NANDINI: How many times in the past year have you    Used an illegal drug or used a prescription medication for non-medical reasons? Never Filed at: 02/13/2022 0231                [facility]  Number of Diagnoses or Management Options  [facility]): new and requires workup  Diagnosis management comments: -patient presenting for chief complaint abdominal pain  -laboratory evaluation significant for leukocytosis  -CT evaluation significant for pancolitis  -given this patient's CT findings, hypothermia on physical examination, marked laboratory derangements, this patient needs admission for further management     -patient admitted to hospital        Amount and/or Complexity of Data Reviewed  Clinical lab tests: ordered and reviewed  Tests in the radiology section of CPT®: ordered and reviewed  Tests in the medicine section of CPT®: ordered and reviewed  Decide to obtain previous medical records or to obtain history from someone other than the patient: yes  Review and summarize past medical records: yes  Discuss the patient with other providers: yes  Independent visualization of images, tracings, or specimens: yes        Disposition  Final diagnoses:   Pancolitis ([address] )     Time reflects when diagnosis was documented in both MDM as applicable and the Disposition within this note     Time User Action Codes Description Comment    2/13/2022 10:49 AM Ludy Colmenares Add [K51 [id number]] Pancolitis [facility])       ED Disposition     ED Disposition Condition Date/Time Comment    Admit Stable Sun Feb 13, 2022 10:49 AM Case was discussed with saud and the patient's admission status was agreed to be Admission Status: inpatient status to the service of Dr Kwan Garcia           Follow-up Information    None         Current Discharge Medication List CONTINUE these medications which have NOT CHANGED    Details   ASPIRIN 81 PO Take by mouth      B Complex-C (B COMPLEX-VITAMIN C PO) Take by mouth      Calcium Carbonate (Calcium 600) 1500 (600 Ca) MG TABS Take by mouth      calcium carbonate (OS-KONSTANTIN) 1250 (500 Ca) MG chewable tablet Chew      Coenzyme Q10 100 MG TABS Take by mouth      cyanocobalamin (VITAMIN B-12) 100 mcg tablet Take by mouth      Garlic 790 MG TABS Take by mouth      Grape Seed 50 MG TABS Take by mouth      Krill Oil 1000 MG CAPS Take by mouth      levothyroxine (Synthroid) 25 mcg tablet Take 1 tablet by mouth daily      LORazepam (ATIVAN) 0 5 mg tablet Take by mouth      losartan (Cozaar) 25 mg tablet Take 1 tablet by mouth daily      thiamine (VITAMIN B1) 100 mg tablet Take by mouth      venlafaxine (EFFEXOR) 100 MG tablet Take 220 mg by mouth      Vitamin D, Cholecalciferol, 10 MCG (400 UNIT) CAPS Take by mouth           No discharge procedures on file  PDMP Review       Value Time User    PDMP Reviewed  Yes 2/13/2022 11:52 AM Brittany Hernandez MD           ED Provider  Attending physically available and evaluated Otilia Mccabe  CLAIRE managed the patient along with the ED Attending      Electronically Signed by         Gaviota Gifford DO  02/15/22 2128       Gaviota Gifford DO  02/17/22 2107

## 2022-02-13 NOTE — ASSESSMENT & PLAN NOTE
· History of constipation and had pellet stools yesterday  Patient came in due to abdominal pains at the suprapubic and left lower quadrant areas  Patient had diarrhea and nausea and vomiting here in the emergency room  No fever or chills  However patient was found to be hypothermic in the emergency room  · Viral versus bacterial   Possibility patient's diarrhea in the emergency room, an overflow diarrhea, with patient having history of constipation  · Stools were sent for enteric PCR as well as C difficile PCR/EIA  · Started on IV Zosyn in the emergency room, will continue for now  If no further evidence of a bacterial infection, consider discontinuing antibiotic  · If no improvement, consider GI consult as an inpatient  Otherwise, may have outpatient GI follow-up for eventual colonoscopy  · Pain control p r n  [facility] Patient was also ordered with Bentyl p r n  For abdominal cramping  · Please see plans under sepsis

## 2022-02-14 PROBLEM — N17.9 AKI (ACUTE KIDNEY INJURY) (HCC): Status: ACTIVE | Noted: 2022-02-14

## 2022-02-14 LAB
ANION GAP SERPL CALCULATED.3IONS-SCNC: 13 MMOL/L (ref 4–13)
BASOPHILS # BLD AUTO: 0.02 THOUSANDS/ΜL (ref 0–0.1)
BASOPHILS NFR BLD AUTO: 0 % (ref 0–1)
BUN SERPL-MCNC: 28 MG/DL (ref 5–25)
C DIFF TOX GENS STL QL NAA+PROBE: NEGATIVE
CALCIUM SERPL-MCNC: 8.5 MG/DL (ref 8.3–10.1)
CAMPYLOBACTER DNA SPEC NAA+PROBE: NORMAL
CHLORIDE SERPL-SCNC: 102 MMOL/L (ref 100–108)
CO2 SERPL-SCNC: 21 MMOL/L (ref 21–32)
CREAT SERPL-MCNC: 1.48 MG/DL (ref 0.6–1.3)
EOSINOPHIL # BLD AUTO: 0 THOUSAND/ΜL (ref 0–0.61)
EOSINOPHIL NFR BLD AUTO: 0 % (ref 0–6)
ERYTHROCYTE [DISTWIDTH] IN BLOOD BY AUTOMATED COUNT: 13.2 % (ref 11.6–15.1)
GFR SERPL CREATININE-BSD FRML MDRD: 36 ML/MIN/1.73SQ M
GLUCOSE SERPL-MCNC: 160 MG/DL (ref 65–140)
HCT VFR BLD AUTO: 53.8 % (ref 34.8–46.1)
HGB BLD-MCNC: 17.6 G/DL (ref 11.5–15.4)
IMM GRANULOCYTES # BLD AUTO: 0.12 THOUSAND/UL (ref 0–0.2)
IMM GRANULOCYTES NFR BLD AUTO: 1 % (ref 0–2)
LACTATE SERPL-SCNC: 3.3 MMOL/L (ref 0.5–2)
LACTATE SERPL-SCNC: 3.7 MMOL/L (ref 0.5–2)
LACTATE SERPL-SCNC: 4.3 MMOL/L (ref 0.5–2)
LYMPHOCYTES # BLD AUTO: 4.84 THOUSANDS/ΜL (ref 0.6–4.47)
LYMPHOCYTES NFR BLD AUTO: 29 % (ref 14–44)
MAGNESIUM SERPL-MCNC: 2.4 MG/DL (ref 1.6–2.6)
MCH RBC QN AUTO: 28.9 PG (ref 26.8–34.3)
MCHC RBC AUTO-ENTMCNC: 32.7 G/DL (ref 31.4–37.4)
MCV RBC AUTO: 88 FL (ref 82–98)
MONOCYTES # BLD AUTO: 0.88 THOUSAND/ΜL (ref 0.17–1.22)
MONOCYTES NFR BLD AUTO: 5 % (ref 4–12)
NEUTROPHILS # BLD AUTO: 11.05 THOUSANDS/ΜL (ref 1.85–7.62)
NEUTS SEG NFR BLD AUTO: 65 % (ref 43–75)
NRBC BLD AUTO-RTO: 0 /100 WBCS
PHOSPHATE SERPL-MCNC: 6.6 MG/DL (ref 2.3–4.1)
PLATELET # BLD AUTO: 308 THOUSANDS/UL (ref 149–390)
PMV BLD AUTO: 9.3 FL (ref 8.9–12.7)
POTASSIUM SERPL-SCNC: 4.8 MMOL/L (ref 3.5–5.3)
PROCALCITONIN SERPL-MCNC: 36.79 NG/ML
RBC # BLD AUTO: 6.1 MILLION/UL (ref 3.81–5.12)
SALMONELLA DNA SPEC QL NAA+PROBE: NORMAL
SHIGA TOXIN STX GENE SPEC NAA+PROBE: NORMAL
SHIGELLA DNA SPEC QL NAA+PROBE: NORMAL
SODIUM SERPL-SCNC: 136 MMOL/L (ref 136–145)
WBC # BLD AUTO: 16.91 THOUSAND/UL (ref 4.31–10.16)

## 2022-02-14 PROCEDURE — 36415 COLL VENOUS BLD VENIPUNCTURE: CPT | Performed by: INTERNAL MEDICINE

## 2022-02-14 PROCEDURE — 99232 SBSQ HOSP IP/OBS MODERATE 35: CPT | Performed by: HOSPITALIST

## 2022-02-14 PROCEDURE — 85027 COMPLETE CBC AUTOMATED: CPT | Performed by: INTERNAL MEDICINE

## 2022-02-14 PROCEDURE — 84100 ASSAY OF PHOSPHORUS: CPT | Performed by: INTERNAL MEDICINE

## 2022-02-14 PROCEDURE — 80048 BASIC METABOLIC PNL TOTAL CA: CPT | Performed by: INTERNAL MEDICINE

## 2022-02-14 PROCEDURE — 83735 ASSAY OF MAGNESIUM: CPT | Performed by: INTERNAL MEDICINE

## 2022-02-14 PROCEDURE — 84145 PROCALCITONIN (PCT): CPT | Performed by: INTERNAL MEDICINE

## 2022-02-14 PROCEDURE — 83605 ASSAY OF LACTIC ACID: CPT | Performed by: INTERNAL MEDICINE

## 2022-02-14 RX ORDER — HEPARIN SODIUM 5000 [USP'U]/ML
5000 INJECTION, SOLUTION INTRAVENOUS; SUBCUTANEOUS EVERY 8 HOURS SCHEDULED
Status: DISCONTINUED | OUTPATIENT
Start: 2022-02-15 | End: 2022-02-18 | Stop reason: HOSPADM

## 2022-02-14 RX ADMIN — SODIUM CHLORIDE, SODIUM GLUCONATE, SODIUM ACETATE, POTASSIUM CHLORIDE, MAGNESIUM CHLORIDE, SODIUM PHOSPHATE, DIBASIC, AND POTASSIUM PHOSPHATE 200 ML/HR: .53; .5; .37; .037; .03; .012; .00082 INJECTION, SOLUTION INTRAVENOUS at 23:35

## 2022-02-14 RX ADMIN — SODIUM CHLORIDE, SODIUM GLUCONATE, SODIUM ACETATE, POTASSIUM CHLORIDE, MAGNESIUM CHLORIDE, SODIUM PHOSPHATE, DIBASIC, AND POTASSIUM PHOSPHATE 200 ML/HR: .53; .5; .37; .037; .03; .012; .00082 INJECTION, SOLUTION INTRAVENOUS at 12:04

## 2022-02-14 RX ADMIN — PIPERACILLIN AND TAZOBACTAM 3.38 G: 36; 4.5 INJECTION, POWDER, FOR SOLUTION INTRAVENOUS at 23:31

## 2022-02-14 RX ADMIN — PIPERACILLIN AND TAZOBACTAM 3.38 G: 36; 4.5 INJECTION, POWDER, FOR SOLUTION INTRAVENOUS at 17:09

## 2022-02-14 RX ADMIN — PIPERACILLIN AND TAZOBACTAM 3.38 G: 36; 4.5 INJECTION, POWDER, FOR SOLUTION INTRAVENOUS at 10:57

## 2022-02-14 RX ADMIN — HYDROMORPHONE HYDROCHLORIDE 0.2 MG: 1 INJECTION, SOLUTION INTRAMUSCULAR; INTRAVENOUS; SUBCUTANEOUS at 10:30

## 2022-02-14 RX ADMIN — SODIUM CHLORIDE, SODIUM GLUCONATE, SODIUM ACETATE, POTASSIUM CHLORIDE, MAGNESIUM CHLORIDE, SODIUM PHOSPHATE, DIBASIC, AND POTASSIUM PHOSPHATE 200 ML/HR: .53; .5; .37; .037; .03; .012; .00082 INJECTION, SOLUTION INTRAVENOUS at 17:13

## 2022-02-14 RX ADMIN — VENLAFAXINE HYDROCHLORIDE 225 MG: 37.5 CAPSULE, EXTENDED RELEASE ORAL at 09:24

## 2022-02-14 RX ADMIN — ENOXAPARIN SODIUM 40 MG: 40 INJECTION SUBCUTANEOUS at 09:24

## 2022-02-14 RX ADMIN — LEVOTHYROXINE SODIUM 50 MCG: 50 TABLET ORAL at 09:24

## 2022-02-14 RX ADMIN — HYDROMORPHONE HYDROCHLORIDE 0.2 MG: 1 INJECTION, SOLUTION INTRAMUSCULAR; INTRAVENOUS; SUBCUTANEOUS at 23:52

## 2022-02-14 RX ADMIN — LOSARTAN POTASSIUM 25 MG: 25 TABLET, FILM COATED ORAL at 09:26

## 2022-02-14 RX ADMIN — PIPERACILLIN AND TAZOBACTAM 3.38 G: 36; 4.5 INJECTION, POWDER, FOR SOLUTION INTRAVENOUS at 04:33

## 2022-02-14 RX ADMIN — HYDROMORPHONE HYDROCHLORIDE 0.2 MG: 1 INJECTION, SOLUTION INTRAMUSCULAR; INTRAVENOUS; SUBCUTANEOUS at 15:28

## 2022-02-14 NOTE — ASSESSMENT & PLAN NOTE
· Noted on am labs; likely d/t sepsis/dehydration d/t diarrhea  · Cont IVF   · Check UA   · dc ARB (received on 2/14)

## 2022-02-14 NOTE — ASSESSMENT & PLAN NOTE
· Per discussion with the patient, she is on venlafaxine as well as Ativan bridgette Quinteros · venlafaxine 225 mg once a day  · Continue these medications

## 2022-02-14 NOTE — ASSESSMENT & PLAN NOTE
· Patient has history of constipation  · Today, patient was also found to have hypothermia  · TSH is mildly elevated   · free T4/t3 wnl   · As per discussion with the patient, she is compliant with her levothyroxine medication and takes it at the right time before breakfast   · With symptoms of hypothyroidism, patient's dose of levothyroxine increased from 25 to 50 mcg once a day

## 2022-02-14 NOTE — PLAN OF CARE
Problem: INFECTION - ADULT  Goal: Absence or prevention of progression during hospitalization  Description: INTERVENTIONS:  - Assess and monitor for signs and symptoms of infection  - Monitor lab/diagnostic results  - Monitor all insertion sites, i e  indwelling lines, tubes, and drains  - Monitor endotracheal if appropriate and nasal secretions for changes in amount and color  - Kingsley appropriate cooling/warming therapies per order  - Administer medications as ordered  - Instruct and encourage patient and family to use good hand hygiene technique  - Identify and instruct in appropriate isolation precautions for identified infection/condition  Outcome: Progressing  Goal: Absence of fever/infection during neutropenic period  Description: INTERVENTIONS:  - Monitor WBC    Outcome: Progressing

## 2022-02-14 NOTE — ASSESSMENT & PLAN NOTE
· Present on admission with hypothermia at 94 1° F, leukocytosis at 17,000  Elevated lactic acid level  · Source:  Colitis; possible viral versus bacterial   · On sepsis order set/protocol  · cont IV Zosyn    · Procalcitonin: 0 05-->36  · BCx: NGTD  · If no further evidence of a bacterial infection, consider discontinuing antibiotic  · Will eventually need gastroenterologist referral, most specially for colonoscopy, likely as an outpatient; however, if no significant improvement of patient's GI symptoms/colitis, consider GI consult  · IV fluids  · LA still elevated but trending down; would check later today and if normal, stop trending  · Continue Anamaria Hugger as needed for hypothermia

## 2022-02-14 NOTE — UTILIZATION REVIEW
Initial Clinical Review    Admission: Date/Time/Statement:   Admission Orders (From admission, onward)     Ordered        02/13/22 1050  Inpatient Admission  Once                      Orders Placed This Encounter   Procedures    Inpatient Admission     Standing Status:   Standing     Number of Occurrences:   1     Order Specific Question:   Level of Care     Answer:   Med Surg [16]     Order Specific Question:   Estimated length of stay     Answer:   More than 2 Midnights     Order Specific Question:   Certification     Answer:   I certify that inpatient services are medically necessary for this patient for a duration of greater than two midnights  See H&P and MD Progress Notes for additional information about the patient's course of treatment  ED Arrival Information     Expected Arrival Acuity    - 2/13/2022 07:14 Emergent         Means of arrival Escorted by Service Admission type    Ambulance [facility] Urgent         Arrival complaint    abd pain        Chief Complaint   Patient presents with    Abdominal Pain     Right sided abdominal pain that started this AM  Per pt, "I feel like I have to have a BM " Found in bathroom by  who called EMS  Per pt, "I drank too much wine last night " Pt reports drinking 3 glasses of wine last night  Initial Presentation: 74 yo female to ED from home via EMS w/ abd pain   Started w/ abd pain this am   pain to be excruciating, constant and cramping in character  Episode of N/V and diarrhea   Hypothermic in ED temp 94 1 , wbc 17k , elevated lactic acid   Stool sent for PCR , c-diff and EIA   Admitted IP status w/ sepsis and colitis plan for iv zosyn , f/u BC , IVF , lactic acid q2hr until normal , cont usha hugger as needed  Pain control prn , bentyl for abd cramping   HTN cont BP meds  PE: mm dry , abd tenderness , direct tenderness LLQ , suprapubic area          ED Triage Vitals   Temperature Pulse Respirations Blood Pressure SpO2   02/13/22 0731 02/13/22 0719 02/13/22 0719 02/13/22 0719 02/13/22 0719   (!) 94 1 °F (34 5 °C) 65 18 (!) 197/117 100 %      Temp Source Heart Rate Source Patient Position - Orthostatic VS BP Location FiO2 (%)   02/13/22 0731 02/13/22 0719 02/13/22 0719 02/13/22 0719 --   Rectal Monitor Lying Right arm       Pain Score       02/13/22 0719       10 - Worst Possible Pain          Wt Readings from Last 1 Encounters:   02/13/22 47 6 kg (105 lb)     Additional Vital Signs:   02/14/22 0856 97 4 °F (36 3 °C) Abnormal  98 18 123/81 97 96 % None (Room air) Lying   02/14/22 0700 -- 100 16 132/81 100 95 % -- Sitting   02/14/22 0200 97 6 °F (36 4 °C) 96 16 122/86 100 95 % None (Room air) Lying   02/14/22 0130 -- 86 16 120/91 102 98 % None (Room air) Lying   02/13/22 2233 -- 103 18 110/74 86 96 % None (Room air) Lying   02/13/22 1845 98 1 °F (36 7 °C) -- -- -- -- -- -- --   02/13/22 1548 96 5 °F (35 8 °C) Abnormal  -- -- -- -- -- -- --   02/13/22 1440 95 6 °F (35 3 °C) Abnormal  110 Abnormal  18 129/84 101 95 % None (Room air) Lying   02/13/22 1045 95 4 °F (35 2 °C) Abnormal  90 18 144/88 112 100 % None (Room air) Lying   02/13/22 0800 -- 68 18 160/72 103 -- -- Lying   02/13/22 0735 -- 64 -- 184/88 Abnormal  126 100 % None (Room air) Lying   02/13/22 0731 94 1 °F (34 5 °C) Abnormal  -- -- -- -- -- None (Room air)        Pertinent Labs/Diagnostic Test Results:   2/13 CT abd   Pancolitis, most likely infectious  The descending and sigmoid colon are most severely affected with marked adjacent mesenteric edema    No pneumatosis identified        · 2/13 EKG NSR      Results from last 7 days   Lab Units 02/14/22  0417 02/13/22  1311 02/13/22  0754   WBC Thousand/uL 16 91*  --  17 72*   HEMOGLOBIN g/dL 17 6*  --  17 1*   HEMATOCRIT % 53 8*  --  51 4*   PLATELETS Thousands/uL 308 341 295   NEUTROS ABS Thousands/µL 11 05*  --   --      Results from last 7 days   Lab Units 02/14/22 0417 02/13/22  0754   SODIUM mmol/L 136 135* POTASSIUM mmol/L 4 8 3 9   CHLORIDE mmol/L 102 101   CO2 mmol/L 21 21   ANION GAP mmol/L 13 13   BUN mg/dL 28* 19   CREATININE mg/dL 1 48* 0 71   EGFR ml/min/1 73sq m 36 89   CALCIUM mg/dL 8 5 10 0   MAGNESIUM mg/dL 2 4  --    PHOSPHORUS mg/dL 6 6*  --      Results from last 7 days   Lab Units 02/13/22  0754   AST U/L 24   ALT U/L 21   ALK PHOS U/L 102   TOTAL PROTEIN g/dL 7 6   ALBUMIN g/dL 4 3   TOTAL BILIRUBIN mg/dL 0 69     Results from last 7 days   Lab Units 02/14/22  0417 02/13/22  0754   GLUCOSE RANDOM mg/dL 160* 191*       Results from last 7 days   Lab Units 02/13/22  0827   PROTIME seconds 11 7   INR  0 85   PTT seconds 30     Results from last 7 days   Lab Units 02/13/22  0754   TSH 3RD GENERATON uIU/mL 7 341*     Results from last 7 days   Lab Units 02/14/22  0500 02/13/22  0827   PROCALCITONIN ng/ml 36 79* <0 05     Results from last 7 days   Lab Units 02/14/22  0305 02/14/22  0108 02/13/22  2310 02/13/22  2034 02/13/22  1652 02/13/22  1311 02/13/22  1052   LACTIC ACID mmol/L 3 3* 4 3* 3 7* 3 3* 4 5* 4 3* 3 2*     Results from last 7 days   Lab Units 02/13/22  0852   CLARITY UA  Clear   COLOR UA  Yellow   SPEC GRAV UA  1 015   PH UA  7 0   GLUCOSE UA mg/dl 100 (1/10%)*   KETONES UA mg/dl 15 (1+)*   BLOOD UA  Negative   PROTEIN UA mg/dl Negative   NITRITE UA  Negative   BILIRUBIN UA  Negative   UROBILINOGEN UA E U /dl 0 2   LEUKOCYTES UA  Negative       Results from last 7 days   Lab Units 02/13/22  6501   BLOOD CULTURE  Received in Microbiology Lab  Culture in Progress  Received in Microbiology Lab  Culture in Progress       ED Treatment:   Medication Administration from 02/13/2022 0714 to 02/14/2022 0856       Date/Time Order Dose Route Action     02/13/2022 0754 ondansetron (ZOFRAN) injection 4 mg 4 mg Intravenous Given     02/13/2022 0758 sodium chloride 0 9 % bolus 1,000 mL 1,000 mL Intravenous New Bag     02/13/2022 0853 vancomycin (VANCOCIN) IVPB (premix in dextrose) 1,000 mg 200 mL 1,000 mg Intravenous New Bag     02/13/2022 0843 piperacillin-tazobactam (ZOSYN) 3 375 g in sodium chloride 0 9 % 100 mL IVPB 3 375 g Intravenous New Bag     02/13/2022 0829 sodium chloride 0 9 % bolus 1,000 mL 1,000 mL Intravenous New Bag     02/13/2022 1056 ondansetron (ZOFRAN) injection 4 mg 4 mg Intravenous Given     02/13/2022 1056 morphine (PF) 4 mg/mL injection 4 mg 4 mg Intravenous Given     02/13/2022 1845 multi-electrolyte (PLASMALYTE-A/ISOLYTE-S PH 7 4) IV solution 200 mL/hr Intravenous Rate/Dose Change     02/13/2022 1445 multi-electrolyte (PLASMALYTE-A/ISOLYTE-S PH 7 4) IV solution 150 mL/hr Intravenous New Bag     02/14/2022 0433 piperacillin-tazobactam (ZOSYN) 3 375 g in sodium chloride 0 9 % 100 mL IVPB 3 375 g Intravenous New Bag     02/13/2022 2317 piperacillin-tazobactam (ZOSYN) 3 375 g in sodium chloride 0 9 % 100 mL IVPB 3 375 g Intravenous New Bag     02/13/2022 1651 piperacillin-tazobactam (ZOSYN) 3 375 g in sodium chloride 0 9 % 100 mL IVPB 3 375 g Intravenous New Bag        Past Medical History:   Diagnosis Date    Asthma     Disease of thyroid gland     Hypertension     Psychiatric disorder      Present on Admission:   Sepsis ([address] )   Colitis   Acquired hypothyroidism   Primary hypertension   Anxiety      Admitting Diagnosis: Pancolitis (HCC) [K51 00]  Abdominal pain [R10 9]  Age/Sex: 73 y o  female  Admission Orders:  Scheduled Medications:  aspirin, 81 mg, Oral, Once per day on Mon Wed Fri  calcium carbonate, 1 tablet, Oral, BID With Meals  cholecalciferol, 400 Units, Oral, Daily  co-enzyme Q-10, 100 mg, Oral, Daily  cyanocobalamin, 100 mcg, Oral, Daily  enoxaparin, 40 mg, Subcutaneous, Daily  levothyroxine, 50 mcg, Oral, Daily  multivitamin stress formula, 1 tablet, Oral, Daily  piperacillin-tazobactam, 3 375 g, Intravenous, Q6H  thiamine, 100 mg, Oral, Daily  venlafaxine, 225 mg, Oral, Daily      Continuous IV Infusions:  multi-electrolyte, 200 mL/hr, Intravenous, Continuous      PRN Meds:  acetaminophen, 650 mg, Oral, Q6H PRN  dicyclomine, 10 mg, Oral, TID PRN  hydrALAZINE, 5 mg, Intravenous, Q6H PRN  HYDROmorphone, 0 2 mg, Intravenous, Q3H PRN  2/14 x1  LORazepam, 0 5 mg, Oral, Q12H PRN  ondansetron, 4 mg, Intravenous, Q6H PRN  senna, 1 tablet, Oral, HS PRN    I&O   Clear liq diet         Network Utilization Review Department  ATTENTION: Please call with any questions or concerns to [phone] and carefully listen to the prompts so that you are directed to the right person  All voicemails are confidential   Lauri Daley all requests for admission clinical reviews, approved or denied determinations and any other requests to dedicated fax number below belonging to the campus where the patient is receiving treatment   List of dedicated fax numbers for the Facilities:  [address] DENIALS (Administrative/Medical Necessity) [phone]   [address] (Maternity/NICU/Pediatrics) [phone]   [address]  [address] [address] [place] [address] [address] [place] [place] [address] [phone]

## 2022-02-14 NOTE — ASSESSMENT & PLAN NOTE
· History of constipation and had pellet stools yesterday  Patient came in due to abdominal pains at the suprapubic and left lower quadrant areas  Patient had diarrhea and nausea and vomiting here in the emergency room  No fever or chills  However patient was found to be hypothermic in the emergency room  · Viral versus bacterial     · enteric PCR, C difficile PCR/EIA:  Negative   · Cont zosyn d2   · If no improvement, consider GI consult as an inpatient  · Pain control p r n  Manolo Rivas Patient was also ordered with Bentyl p r n  For abdominal cramping  · Please see plans under sepsis

## 2022-02-14 NOTE — PROGRESS NOTES
[facility]  Progress Note - Georges Joaquin 1955, 73 y o  female MRN: [id number]  Unit/Bed#: S -65 Encounter: [id number]  Primary Care Provider: Zen Medina MD   Date and time admitted to hospital: 2/13/2022  7:15 AM    * Sepsis [facility])  Assessment & Plan  · Present on admission with hypothermia at 94 1° F, leukocytosis at 17,000  Elevated lactic acid level  · Source:  Colitis; possible viral versus bacterial   · On sepsis order set/protocol  · cont IV Zosyn    · Procalcitonin: 0 05-->36  · BCx: NGTD  · If no further evidence of a bacterial infection, consider discontinuing antibiotic  · Will eventually need gastroenterologist referral, most specially for colonoscopy, likely as an outpatient; however, if no significant improvement of patient's GI symptoms/colitis, consider GI consult  · IV fluids  · LA still elevated but trending down; would check later today and if normal, stop trending  · Continue Anamaria Hugger as needed for hypothermia  LEVY (acute kidney injury) ([facility] [address] )  Assessment & Plan  · Noted on am labs; likely d/t sepsis/dehydration d/t diarrhea  · Cont IVF   · Check UA   · dc ARB (received on 2/14)    Colitis  Assessment & Plan  · History of constipation and had pellet stools yesterday  Patient came in due to abdominal pains at the suprapubic and left lower quadrant areas  Patient had diarrhea and nausea and vomiting here in the emergency room  No fever or chills  However patient was found to be hypothermic in the emergency room  · Viral versus bacterial     · enteric PCR, C difficile PCR/EIA:  Negative   · Cont zosyn d2   · If no improvement, consider GI consult as an inpatient  · Pain control p r n  Eureka Donnie Patient was also ordered with Bentyl p r n  For abdominal cramping  · Please see plans under sepsis  Anxiety  Assessment & Plan  · Per discussion with the patient, she is on venlafaxine as well as Ativan p r n  Eureka Donnie    · venlafaxine 225 mg once a day   · Continue these medications  Primary hypertension  Assessment & Plan  · Blood pressure in the emergency room, initially high, but has gone down spontaneously  · hold blood pressure meds for now given LEVY and relative hypotension   ·     Acquired hypothyroidism  Assessment & Plan  · Patient has history of constipation  · Today, patient was also found to have hypothermia  · TSH is mildly elevated   · free T4/t3 wnl   · As per discussion with the patient, she is compliant with her levothyroxine medication and takes it at the right time before breakfast   · With symptoms of hypothyroidism, patient's dose of levothyroxine increased from 25 to 50 mcg once a day  VTE Pharmacologic Prophylaxis:   Pharmacologic: Enoxaparin (Lovenox)  Mechanical VTE Prophylaxis in Place: Yes    Patient Centered Rounds: I have performed bedside rounds with nursing staff today  Discussions with Specialists or Other Care Team Provider:     Education and Discussions with Family / Patient: patient and SO at bedside     Time Spent for Care: 30 minutes  More than 50% of total time spent on counseling and coordination of care as described above  Current Length of Stay: 1 day(s)    Current Patient Status: Inpatient   Certification Statement: The patient will continue to require additional inpatient hospital stay due to sepsis due to colitis and LEVY on IVF     Discharge Plan / Estimated Discharge Date: TBD based on clinical course; likely 48-72hours     Code Status: Level 1 - Full Code      Subjective:   Feels better than on admission  No further episodes of diarrhea  Abd pain present but not as severe  No vomiting  Objective:     Vitals:   Temp (24hrs), Av °F (36 1 °C), Min:95 6 °F (35 3 °C), Max:98 1 °F (36 7 °C)    Temp:  [95 6 °F (35 3 °C)-98 1 °F (36 7 °C)] 97 4 °F (36 3 °C)  HR:  [] 98  Resp:  [16-18] 18  BP: (110-132)/(74-91) 123/81  SpO2:  [95 %-98 %] 96 %  Body mass index is 21 21 kg/m²       Input and Output Summary (last 24 hours): Intake/Output Summary (Last 24 hours) at 2/14/2022 1349  Last data filed at 2/14/2022 1204  Gross per 24 hour   Intake 1100 ml   Output --   Net 1100 ml       Physical Exam:     Physical Exam  Vitals and nursing note reviewed  Constitutional:       General: She is not in acute distress  Appearance: Normal appearance  She is not ill-appearing or toxic-appearing  Cardiovascular:      Rate and Rhythm: Normal rate and regular rhythm  Heart sounds: No murmur heard  No gallop  Pulmonary:      Effort: No respiratory distress  Breath sounds: Normal breath sounds  No wheezing  Abdominal:      General: Abdomen is flat  There is no distension  Palpations: Abdomen is soft  Tenderness: There is no abdominal tenderness  There is no guarding or rebound  Neurological:      Mental Status: She is alert  Psychiatric:         Mood and Affect: Mood normal          Behavior: Behavior normal            Additional Data:     Labs:    Results from last 7 days   Lab Units 02/14/22  0417   WBC Thousand/uL 16 91*   HEMOGLOBIN g/dL 17 6*   HEMATOCRIT % 53 8*   PLATELETS Thousands/uL 308   NEUTROS PCT % 65   LYMPHS PCT % 29   MONOS PCT % 5   EOS PCT % 0     Results from last 7 days   Lab Units 02/14/22  0417 02/13/22  0754 02/13/22  0754   POTASSIUM mmol/L 4 8   < > 3 9   CHLORIDE mmol/L 102   < > 101   CO2 mmol/L 21   < > 21   BUN mg/dL 28*   < > 19   CREATININE mg/dL 1 48*   < > 0 71   CALCIUM mg/dL 8 5   < > 10 0   ALK PHOS U/L  --   --  102   ALT U/L  --   --  21   AST U/L  --   --  24    < > = values in this interval not displayed  Results from last 7 days   Lab Units 02/13/22  0827   INR  0 85       * I Have Reviewed All Lab Data Listed Above  * Additional Pertinent Lab Tests Reviewed:  All Labs Within Last 24 Hours Reviewed    Imaging:    Imaging Reports Reviewed Today Include:   Imaging Personally Reviewed by Myself Includes:      Recent Cultures (last 7 days):     Results from last 7 days   Lab Units 02/13/22  1311 02/13/22  0822   BLOOD CULTURE   --  No Growth at 24 hrs  No Growth at 24 hrs  C DIFF TOXIN B BY PCR  Negative  --        Last 24 Hours Medication List:   Current Facility-Administered Medications   Medication Dose Route Frequency Provider Last Rate    acetaminophen  650 mg Oral Q6H PRN Suman Pinto MD      aspirin  81 mg Oral Once per day on Mon Wed Fri Ahsan Nolasco MD      calcium carbonate  1 tablet Oral BID With Meals Suman Pinto MD      cholecalciferol  400 Units Oral Daily [facility] Jose Hernandez MD      co-enzyme Q-10  100 mg Oral Daily [facility] Jose Hernandez MD      cyanocobalamin  100 mcg Oral Daily Thelma Hernandez MD      dicyclomine  10 mg Oral TID PRN Thelma Hernandez MD      enoxaparin  40 mg Subcutaneous Daily [facility] Jose Hernnadez MD      hydrALAZINE  5 mg Intravenous Q6H PRN Thelma Hernandez MD      HYDROmorphone  0 2 mg Intravenous Q3H PRN Thelma Hernandez MD      levothyroxine  50 mcg Oral Daily [facility] Jose Hernandez MD      LORazepam  0 5 mg Oral Q12H PRN [facility] Jose Hernandez MD      multi-electrolyte  200 mL/hr Intravenous Continuous [facility] Jose Hernandez  mL/hr (02/14/22 1204)    multivitamin stress formula  1 tablet Oral Daily Thelma Hernandez MD      ondansetron  4 mg Intravenous Q6H PRN Thelma Hernandez MD      piperacillin-tazobactam  3 375 g Intravenous Q6H Thelma Hernandez MD 3 375 g (02/14/22 1057)    senna  1 tablet Oral HS PRN Thelma Hernandez MD      thiamine  100 mg Oral Daily Thelma Hernandez MD      venlafaxine  225 mg Oral Daily Ahsan Nolasco MD          Today, Patient Was Seen By: Evelyn Martinez MD    ** Please Note: This note has been constructed using a voice recognition system   **

## 2022-02-14 NOTE — ASSESSMENT & PLAN NOTE
· Blood pressure in the emergency room, initially high, but has gone down spontaneously    · hold blood pressure meds for now given LEVY and relative hypotension   ·

## 2022-02-15 LAB
ANION GAP SERPL CALCULATED.3IONS-SCNC: 9 MMOL/L (ref 4–13)
BASOPHILS # BLD MANUAL: 0 THOUSAND/UL (ref 0–0.1)
BASOPHILS NFR MAR MANUAL: 0 % (ref 0–1)
BUN SERPL-MCNC: 18 MG/DL (ref 5–25)
CALCIUM SERPL-MCNC: 7.9 MG/DL (ref 8.3–10.1)
CHLORIDE SERPL-SCNC: 104 MMOL/L (ref 100–108)
CO2 SERPL-SCNC: 25 MMOL/L (ref 21–32)
CREAT SERPL-MCNC: 0.79 MG/DL (ref 0.6–1.3)
EOSINOPHIL # BLD MANUAL: 0 THOUSAND/UL (ref 0–0.4)
EOSINOPHIL NFR BLD MANUAL: 0 % (ref 0–6)
ERYTHROCYTE [DISTWIDTH] IN BLOOD BY AUTOMATED COUNT: 13.1 % (ref 11.6–15.1)
GFR SERPL CREATININE-BSD FRML MDRD: 78 ML/MIN/1.73SQ M
GLUCOSE SERPL-MCNC: 93 MG/DL (ref 65–140)
HCT VFR BLD AUTO: 40.7 % (ref 34.8–46.1)
HGB BLD-MCNC: 13.8 G/DL (ref 11.5–15.4)
LYMPHOCYTES # BLD AUTO: 2.66 THOUSAND/UL (ref 0.6–4.47)
LYMPHOCYTES # BLD AUTO: 28 % (ref 14–44)
MCH RBC QN AUTO: 29.2 PG (ref 26.8–34.3)
MCHC RBC AUTO-ENTMCNC: 33.9 G/DL (ref 31.4–37.4)
MCV RBC AUTO: 86 FL (ref 82–98)
MONOCYTES # BLD AUTO: 0.38 THOUSAND/UL (ref 0–1.22)
MONOCYTES NFR BLD: 4 % (ref 4–12)
NEUTROPHILS # BLD MANUAL: 5.99 THOUSAND/UL (ref 1.85–7.62)
NEUTS BAND NFR BLD MANUAL: 14 % (ref 0–8)
NEUTS SEG NFR BLD AUTO: 49 % (ref 43–75)
PLATELET # BLD AUTO: 200 THOUSANDS/UL (ref 149–390)
PLATELET BLD QL SMEAR: ADEQUATE
PMV BLD AUTO: 9.7 FL (ref 8.9–12.7)
POTASSIUM SERPL-SCNC: 3.2 MMOL/L (ref 3.5–5.3)
RBC # BLD AUTO: 4.73 MILLION/UL (ref 3.81–5.12)
RBC MORPH BLD: NORMAL
SODIUM SERPL-SCNC: 138 MMOL/L (ref 136–145)
VARIANT LYMPHS # BLD AUTO: 5 %
WBC # BLD AUTO: 9.51 THOUSAND/UL (ref 4.31–10.16)
WBC TOXIC VACUOLES BLD QL SMEAR: PRESENT

## 2022-02-15 PROCEDURE — 85027 COMPLETE CBC AUTOMATED: CPT | Performed by: HOSPITALIST

## 2022-02-15 PROCEDURE — 85007 BL SMEAR W/DIFF WBC COUNT: CPT | Performed by: HOSPITALIST

## 2022-02-15 PROCEDURE — 80048 BASIC METABOLIC PNL TOTAL CA: CPT | Performed by: HOSPITALIST

## 2022-02-15 PROCEDURE — 99232 SBSQ HOSP IP/OBS MODERATE 35: CPT | Performed by: INTERNAL MEDICINE

## 2022-02-15 RX ORDER — POTASSIUM CHLORIDE 14.9 MG/ML
20 INJECTION INTRAVENOUS ONCE
Status: COMPLETED | OUTPATIENT
Start: 2022-02-15 | End: 2022-02-15

## 2022-02-15 RX ADMIN — SODIUM CHLORIDE, SODIUM GLUCONATE, SODIUM ACETATE, POTASSIUM CHLORIDE, MAGNESIUM CHLORIDE, SODIUM PHOSPHATE, DIBASIC, AND POTASSIUM PHOSPHATE 200 ML/HR: .53; .5; .37; .037; .03; .012; .00082 INJECTION, SOLUTION INTRAVENOUS at 04:47

## 2022-02-15 RX ADMIN — VENLAFAXINE HYDROCHLORIDE 225 MG: 37.5 CAPSULE, EXTENDED RELEASE ORAL at 08:25

## 2022-02-15 RX ADMIN — HEPARIN SODIUM 5000 UNITS: 5000 INJECTION INTRAVENOUS; SUBCUTANEOUS at 21:38

## 2022-02-15 RX ADMIN — LEVOTHYROXINE SODIUM 50 MCG: 50 TABLET ORAL at 08:25

## 2022-02-15 RX ADMIN — PIPERACILLIN AND TAZOBACTAM 3.38 G: 36; 4.5 INJECTION, POWDER, FOR SOLUTION INTRAVENOUS at 10:00

## 2022-02-15 RX ADMIN — PIPERACILLIN AND TAZOBACTAM 3.38 G: 36; 4.5 INJECTION, POWDER, FOR SOLUTION INTRAVENOUS at 17:14

## 2022-02-15 RX ADMIN — HYDRALAZINE HYDROCHLORIDE 5 MG: 20 INJECTION, SOLUTION INTRAMUSCULAR; INTRAVENOUS at 16:06

## 2022-02-15 RX ADMIN — HEPARIN SODIUM 5000 UNITS: 5000 INJECTION INTRAVENOUS; SUBCUTANEOUS at 14:28

## 2022-02-15 RX ADMIN — HEPARIN SODIUM 5000 UNITS: 5000 INJECTION INTRAVENOUS; SUBCUTANEOUS at 05:07

## 2022-02-15 RX ADMIN — POTASSIUM CHLORIDE 20 MEQ: 200 INJECTION, SOLUTION INTRAVENOUS at 16:06

## 2022-02-15 RX ADMIN — SODIUM CHLORIDE, SODIUM GLUCONATE, SODIUM ACETATE, POTASSIUM CHLORIDE, MAGNESIUM CHLORIDE, SODIUM PHOSPHATE, DIBASIC, AND POTASSIUM PHOSPHATE 200 ML/HR: .53; .5; .37; .037; .03; .012; .00082 INJECTION, SOLUTION INTRAVENOUS at 10:00

## 2022-02-15 RX ADMIN — PIPERACILLIN AND TAZOBACTAM 3.38 G: 36; 4.5 INJECTION, POWDER, FOR SOLUTION INTRAVENOUS at 05:07

## 2022-02-15 RX ADMIN — PIPERACILLIN AND TAZOBACTAM 3.38 G: 36; 4.5 INJECTION, POWDER, FOR SOLUTION INTRAVENOUS at 22:08

## 2022-02-15 RX ADMIN — Medication 100 MG: at 08:25

## 2022-02-15 NOTE — ASSESSMENT & PLAN NOTE
Present on admission with hypothermia at 94 1° F, leukocytosis at 17,000  Elevated lactic acid level  Source:  Colitis; possible viral versus bacterial   On sepsis order set/protocol  cont IV Zosyn    Procalcitonin: 0 05-->36  BCx: NGTD  If no further evidence of a bacterial infection, consider discontinuing antibiotic  Will eventually need gastroenterologist referral, most specially for colonoscopy, likely as an outpatient; however, if no significant improvement of patient's GI symptoms/colitis, consider GI consult  IV fluids  LA still elevated but trending down; would check later today and if normal, stop trending  Continue Anamaria Hugger as needed for hypothermia

## 2022-02-15 NOTE — PROGRESS NOTES
[facility]  Progress Note - [facility] 1955, 73 y o  female MRN: [id number]  Unit/Bed#: S MS Laura-56 Encounter: [id number]  Primary Care Provider: Ronal Rapp MD   Date and time admitted to hospital: 2/13/2022  7:15 AM    LEVY (acute kidney injury) [facility])  Assessment & Plan  Noted on am labs; likely d/t sepsis/dehydration d/t diarrhea  Check UA   dc ARB (received on 2/14)    Anxiety  Assessment & Plan  Per discussion with the patient, she is on venlafaxine as well as Ativan p r n  Hernandez Simple venlafaxine 225 mg once a day  Continue these medications  Primary hypertension  Assessment & Plan  Blood pressure in the emergency room, initially high, but has gone down spontaneously  hold blood pressure meds for now given LEVY and relative hypotension       Acquired hypothyroidism  Assessment & Plan  Patient has history of constipation  Today, patient was also found to have hypothermia  TSH is mildly elevated   free T4/t3 wnl   As per discussion with the patient, she is compliant with her levothyroxine medication and takes itat the right time before breakfast   With symptoms of hypothyroidism, patient's dose of levothyroxine increased from 25 to 50 mcg once a day  Colitis  Assessment & Plan  History of constipation and had pellet stools yesterday  Patient came in due to abdominal pains at the suprapubic and left lower quadrant areas  Patient had diarrhea and nausea and vomiting here in the emergency room  No fever or chills  However patient was found to be hypothermic in the emergency room  Viral versus bacterial     enteric PCR, C difficile PCR/EIA:  Negative   Cont zosyn d3  If no improvement, consider GI consult as an inpatient  Pain control p r n  Hernandez Simple Patient was also ordered with Bentyl p r n  For abdominal cramping  Please see plans under sepsis  * Sepsis ([facility] [address] )  Assessment & Plan  Present on admission with hypothermia at 94 1° F, leukocytosis at 17,000   Elevated lactic acid level  Source:  Colitis; possible viral versus bacterial   On sepsis order set/protocol  cont IV Zosyn    Procalcitonin: 0 05-->36  BCx: NGTD  If no further evidence of a bacterial infection, consider discontinuing antibiotic  Will eventually need gastroenterologist referral, most specially for colonoscopy, likely as an outpatient; however, if no significant improvement of patient's GI symptoms/colitis, consider GI consult  IV fluids  LA still elevated but trending down; would check later today and if normal, stop trending  Continue Anamaria Hugger as needed for hypothermia  VTE Pharmacologic Prophylaxis: VTE Score: 4 Moderate Risk (Score 3-4) - Pharmacological DVT Prophylaxis Ordered: heparin  Patient Centered Rounds: I performed bedside rounds with nursing staff today  Discussions with Specialists or Other Care Team Provider: Discussed with nursing  Education and Discussions with Family / Patient: olivia Cynthia Mary-       Time Spent for Care: 30 minutes  More than 50% of total time spent on counseling and coordination of care as described above  Current Length of Stay: 2 day(s)  Current Patient Status: Inpatient   Certification Statement: The patient will continue to require additional inpatient hospital stay due to colitis  Discharge Plan: Anticipate discharge in 24-48 hrs to home  Code Status: Level 1 - Full Code    Subjective:   Patient seen and examined   No new complaints       Objective:     Vitals:   Temp (24hrs), Av 4 °F (36 9 °C), Min:98 3 °F (36 8 °C), Max:98 4 °F (36 9 °C)    Temp:  [98 3 °F (36 8 °C)-98 4 °F (36 9 °C)] 98 4 °F (36 9 °C)  HR:  [78-85] 85  Resp:  [18] 18  BP: (121-142)/(56-67) 142/67  SpO2:  [90 %-93 %] 90 %  Body mass index is 21 21 kg/m²  Input and Output Summary (last 24 hours):      Intake/Output Summary (Last 24 hours) at 2/15/2022 1511  Last data filed at 2/15/2022 1401  Gross per 24 hour   Intake 2296 67 ml   Output 2322 ml   Net -25 33 ml Physical Exam:   Physical Exam  Constitutional:       General: She is not in acute distress  Appearance: She is not ill-appearing, toxic-appearing or diaphoretic  HENT:      Head: Normocephalic  Nose: Nose normal       Mouth/Throat:      Mouth: Mucous membranes are moist    Eyes:      General: No scleral icterus  Right eye: No discharge  Left eye: No discharge  Pupils: Pupils are equal, round, and reactive to light  Cardiovascular:      Rate and Rhythm: Normal rate  Heart sounds: No murmur heard  No friction rub  No gallop  Pulmonary:      Effort: Pulmonary effort is normal  No respiratory distress  Breath sounds: No stridor  No rhonchi or rales  Chest:      Chest wall: No tenderness  Abdominal:      General: There is no distension  Palpations: There is no mass  Tenderness: There is no abdominal tenderness  There is no right CVA tenderness, left CVA tenderness or guarding  Hernia: No hernia is present  Musculoskeletal:         General: No swelling or tenderness  Right lower leg: No edema  Skin:     Coloration: Skin is pale  Skin is not jaundiced  Findings: No bruising, erythema, lesion or rash  Neurological:      General: No focal deficit present  Cranial Nerves: No cranial nerve deficit  Sensory: No sensory deficit  Motor: No weakness        Coordination: Coordination normal       Gait: Gait normal       Deep Tendon Reflexes: Reflexes normal    Psychiatric:         Mood and Affect: Mood normal           Additional Data:     Labs:  Results from last 7 days   Lab Units 02/15/22  1203 02/14/22  0417 02/13/22  0754   WBC Thousand/uL 9 51 16 91*   < >   HEMOGLOBIN g/dL 13 8 17 6*   < >   HEMATOCRIT % 40 7 53 8*   < >   PLATELETS Thousands/uL 200 308   < >   BANDS PCT % 14*  --   --    NEUTROS PCT %  --  65  --    LYMPHS PCT %  --  29  --    LYMPHO PCT % 28  --    < >   MONOS PCT %  --  5  --    MONO PCT % 4  --    < > EOS PCT % 0 0   < >    < > = values in this interval not displayed  Results from last 7 days   Lab Units 02/15/22  0618 02/14/22  0417 02/13/22  0754   SODIUM mmol/L 138   < > 135*   POTASSIUM mmol/L 3 2*   < > 3 9   CHLORIDE mmol/L 104   < > 101   CO2 mmol/L 25   < > 21   BUN mg/dL 18   < > 19   CREATININE mg/dL 0 79   < > 0 71   ANION GAP mmol/L 9   < > 13   CALCIUM mg/dL 7 9*   < > 10 0   ALBUMIN g/dL  --   --  4 3   TOTAL BILIRUBIN mg/dL  --   --  0 69   ALK PHOS U/L  --   --  102   ALT U/L  --   --  21   AST U/L  --   --  24   GLUCOSE RANDOM mg/dL 93   < > 191*    < > = values in this interval not displayed  Results from last 7 days   Lab Units 02/13/22  0827   INR  0 85             Results from last 7 days   Lab Units 02/14/22  0500 02/14/22  0305 02/14/22  0108 02/13/22  2310 02/13/22  2034 02/13/22  1052 02/13/22  0827   LACTIC ACID mmol/L  --  3 3* 4 3* 3 7* 3 3*   < >  --    PROCALCITONIN ng/ml 36 79*  --   --   --   --   --  <0 05    < > = values in this interval not displayed  Lines/Drains:  Invasive Devices  Report    Peripheral Intravenous Line            Peripheral IV 02/13/22 Left Antecubital 2 days    Peripheral IV 02/13/22 Right Antecubital 2 days                      Imaging: No pertinent imaging reviewed  Recent Cultures (last 7 days):   Results from last 7 days   Lab Units 02/13/22  1311 02/13/22  0822   BLOOD CULTURE   --  No Growth at 48 hrs  No Growth at 48 hrs     C DIFF TOXIN B BY PCR  Negative  --        Last 24 Hours Medication List:   Current Facility-Administered Medications   Medication Dose Route Frequency Provider Last Rate    acetaminophen  650 mg Oral Q6H PRN Suman Adhikari MD      aspirin  81 mg Oral Once per day on Mon Wed Fri Ahsan Anthony 20, MD      calcium carbonate  1 tablet Oral BID With Meals Suman Adhikari MD      cholecalciferol  400 Units Oral Daily Brisa Hernandez MD      co-enzyme Q-10  100 mg Oral Daily Brittany Hernandez MD      cyanocobalamin  100 mcg Oral Daily Suman Hodges MD      dicyclomine  10 mg Oral TID PRN Brittany Hernandez MD      heparin (porcine)  5,000 Units Subcutaneous [facility] Jemma Penn MD      hydrALAZINE  5 mg Intravenous Q6H PRN Suman Hodges MD      HYDROmorphone  0 2 mg Intravenous Q3H PRN Brittany Hernandez MD      levothyroxine  50 mcg Oral Daily Suman Hodges MD      LORazepam  0 5 mg Oral Q12H PRN Brittany Hernandez MD      multi-electrolyte  200 mL/hr Intravenous Continuous Brittany Hernandez  mL/hr (02/15/22 1000)    multivitamin stress formula  1 tablet Oral Daily Brittany Hernandez MD      ondansetron  4 mg Intravenous Q6H PRN Brittany Hernandez MD      piperacillin-tazobactam  3 375 g Intravenous Q6H Brittany Hernandez MD 3 375 g (02/15/22 1000)    potassium chloride  20 mEq Intravenous Once Shelly Nicolas MD      senna  1 tablet Oral HS PRN Brittany Hernandez MD      thiamine  100 mg Oral Daily Brittany Hernandez MD      venlafaxine  225 mg Oral Daily Kimberli Conley MD          Today, Patient Was Seen By: Shelly Nicolas MD    **Please Note: This note may have been constructed using a voice recognition system  **

## 2022-02-15 NOTE — ASSESSMENT & PLAN NOTE
History of constipation and had pellet stools yesterday  Patient came in due to abdominal pains at the suprapubic and left lower quadrant areas  Patient had diarrhea and nausea and vomiting here in the emergency room  No fever or chills  However patient was found to be hypothermic in the emergency room  Viral versus bacterial     enteric PCR, C difficile PCR/EIA:  Negative   Cont zosyn d3  If no improvement, consider GI consult as an inpatient  Pain control p r n  Ata Mixon Patient was also ordered with Bentyl p r n  For abdominal cramping  Please see plans under sepsis

## 2022-02-15 NOTE — ASSESSMENT & PLAN NOTE
Blood pressure in the emergency room, initially high, but has gone down spontaneously    hold blood pressure meds for now given LEVY and relative hypotension

## 2022-02-15 NOTE — PLAN OF CARE
Problem: PAIN - ADULT  Goal: Verbalizes/displays adequate comfort level or baseline comfort level  Description: Interventions:  - Encourage patient to monitor pain and request assistance  - Assess pain using appropriate pain scale  - Administer analgesics based on type and severity of pain and evaluate response  - Implement non-pharmacological measures as appropriate and evaluate response  - Consider cultural and social influences on pain and pain management  - Notify physician/advanced practitioner if interventions unsuccessful or patient reports new pain  Outcome: Progressing     Problem: INFECTION - ADULT  Goal: Absence or prevention of progression during hospitalization  Description: INTERVENTIONS:  - Assess and monitor for signs and symptoms of infection  - Monitor lab/diagnostic results  - Monitor all insertion sites, i e  indwelling lines, tubes, and drains  - Monitor endotracheal if appropriate and nasal secretions for changes in amount and color  - [place] appropriate cooling/warming therapies per order  - Administer medications as ordered  - Instruct and encourage patient and family to use good hand hygiene technique  - Identify and instruct in appropriate isolation precautions for identified infection/condition  Outcome: Progressing  Goal: Absence of fever/infection during neutropenic period  Description: INTERVENTIONS:  - Monitor WBC    Outcome: Progressing  Problem: GASTROINTESTINAL - ADULT  Goal: Maintains or returns to baseline bowel function  Description: INTERVENTIONS:  - Assess bowel function  - Encourage oral fluids to ensure adequate hydration  - Administer IV fluids if ordered to ensure adequate hydration  - Administer ordered medications as needed  - Encourage mobilization and activity  - Consider nutritional services referral to assist patient with adequate nutrition and appropriate food choices  Outcome: Progressing  Goal: Maintains adequate nutritional intake  Description: INTERVENTIONS:  - Monitor percentage of each meal consumed  - Identify factors contributing to decreased intake, treat as appropriate  - Assist with meals as needed  - Monitor I&O, weight, and lab values if indicated  - Obtain nutrition services referral as needed  Outcome: Progressing  Goal: Oral mucous membranes remain intact  Description: INTERVENTIONS  - Assess oral mucosa and hygiene practices  - Implement preventative oral hygiene regimen  - Implement oral medicated treatments as ordered  - Initiate Nutrition services referral as needed  Outcome: Progressing

## 2022-02-15 NOTE — ASSESSMENT & PLAN NOTE
Per discussion with the patient, she is on venlafaxine as well as Ativan p r n  Leonel Manifold venlafaxine 225 mg once a day  Continue these medications

## 2022-02-15 NOTE — ASSESSMENT & PLAN NOTE
Patient has history of constipation  Today, patient was also found to have hypothermia  TSH is mildly elevated   free T4/t3 wnl   As per discussion with the patient, she is compliant with her levothyroxine medication and takes itat the right time before breakfast   With symptoms of hypothyroidism, patient's dose of levothyroxine increased from 25 to 50 mcg once a day

## 2022-02-16 ENCOUNTER — TELEPHONE (OUTPATIENT)
Dept: GASTROENTEROLOGY | Facility: CLINIC | Age: 67
End: 2022-02-16

## 2022-02-16 LAB
ANION GAP SERPL CALCULATED.3IONS-SCNC: 6 MMOL/L (ref 4–13)
BASOPHILS # BLD MANUAL: 0.07 THOUSAND/UL (ref 0–0.1)
BASOPHILS NFR MAR MANUAL: 1 % (ref 0–1)
BUN SERPL-MCNC: 5 MG/DL (ref 5–25)
CALCIUM SERPL-MCNC: 7.6 MG/DL (ref 8.3–10.1)
CHLORIDE SERPL-SCNC: 106 MMOL/L (ref 100–108)
CO2 SERPL-SCNC: 27 MMOL/L (ref 21–32)
CREAT SERPL-MCNC: 0.52 MG/DL (ref 0.6–1.3)
EOSINOPHIL # BLD MANUAL: 0.07 THOUSAND/UL (ref 0–0.4)
EOSINOPHIL NFR BLD MANUAL: 1 % (ref 0–6)
ERYTHROCYTE [DISTWIDTH] IN BLOOD BY AUTOMATED COUNT: 12.8 % (ref 11.6–15.1)
GFR SERPL CREATININE-BSD FRML MDRD: 100 ML/MIN/1.73SQ M
GLUCOSE SERPL-MCNC: 111 MG/DL (ref 65–140)
HCT VFR BLD AUTO: 34.7 % (ref 34.8–46.1)
HGB BLD-MCNC: 11.7 G/DL (ref 11.5–15.4)
LYMPHOCYTES # BLD AUTO: 1.51 THOUSAND/UL (ref 0.6–4.47)
LYMPHOCYTES # BLD AUTO: 21 % (ref 14–44)
MCH RBC QN AUTO: 29.8 PG (ref 26.8–34.3)
MCHC RBC AUTO-ENTMCNC: 33.7 G/DL (ref 31.4–37.4)
MCV RBC AUTO: 88 FL (ref 82–98)
MONOCYTES # BLD AUTO: 0.36 THOUSAND/UL (ref 0–1.22)
MONOCYTES NFR BLD: 5 % (ref 4–12)
NEUTROPHILS # BLD MANUAL: 5.04 THOUSAND/UL (ref 1.85–7.62)
NEUTS BAND NFR BLD MANUAL: 12 % (ref 0–8)
NEUTS SEG NFR BLD AUTO: 58 % (ref 43–75)
PLATELET # BLD AUTO: 179 THOUSANDS/UL (ref 149–390)
PLATELET BLD QL SMEAR: ADEQUATE
PMV BLD AUTO: 9.4 FL (ref 8.9–12.7)
POTASSIUM SERPL-SCNC: 3.4 MMOL/L (ref 3.5–5.3)
RBC # BLD AUTO: 3.93 MILLION/UL (ref 3.81–5.12)
RBC MORPH BLD: NORMAL
SODIUM SERPL-SCNC: 139 MMOL/L (ref 136–145)
VARIANT LYMPHS # BLD AUTO: 2 %
WBC # BLD AUTO: 7.2 THOUSAND/UL (ref 4.31–10.16)

## 2022-02-16 PROCEDURE — 92555 SPEECH THRESHOLD AUDIOMETRY: CPT | Performed by: INTERNAL MEDICINE

## 2022-02-16 PROCEDURE — 85027 COMPLETE CBC AUTOMATED: CPT | Performed by: HOSPITALIST

## 2022-02-16 PROCEDURE — 99232 SBSQ HOSP IP/OBS MODERATE 35: CPT | Performed by: INTERNAL MEDICINE

## 2022-02-16 PROCEDURE — 85007 BL SMEAR W/DIFF WBC COUNT: CPT | Performed by: HOSPITALIST

## 2022-02-16 PROCEDURE — 80048 BASIC METABOLIC PNL TOTAL CA: CPT | Performed by: HOSPITALIST

## 2022-02-16 RX ORDER — POTASSIUM CHLORIDE 14.9 MG/ML
20 INJECTION INTRAVENOUS ONCE
Status: COMPLETED | OUTPATIENT
Start: 2022-02-16 | End: 2022-02-16

## 2022-02-16 RX ADMIN — PIPERACILLIN AND TAZOBACTAM 3.38 G: 36; 4.5 INJECTION, POWDER, FOR SOLUTION INTRAVENOUS at 17:39

## 2022-02-16 RX ADMIN — PIPERACILLIN AND TAZOBACTAM 3.38 G: 36; 4.5 INJECTION, POWDER, FOR SOLUTION INTRAVENOUS at 05:25

## 2022-02-16 RX ADMIN — HEPARIN SODIUM 5000 UNITS: 5000 INJECTION INTRAVENOUS; SUBCUTANEOUS at 05:25

## 2022-02-16 RX ADMIN — PIPERACILLIN AND TAZOBACTAM 3.38 G: 36; 4.5 INJECTION, POWDER, FOR SOLUTION INTRAVENOUS at 22:20

## 2022-02-16 RX ADMIN — SODIUM CHLORIDE, SODIUM GLUCONATE, SODIUM ACETATE, POTASSIUM CHLORIDE, MAGNESIUM CHLORIDE, SODIUM PHOSPHATE, DIBASIC, AND POTASSIUM PHOSPHATE 200 ML/HR: .53; .5; .37; .037; .03; .012; .00082 INJECTION, SOLUTION INTRAVENOUS at 12:00

## 2022-02-16 RX ADMIN — PIPERACILLIN AND TAZOBACTAM 3.38 G: 36; 4.5 INJECTION, POWDER, FOR SOLUTION INTRAVENOUS at 12:00

## 2022-02-16 RX ADMIN — SODIUM CHLORIDE, SODIUM GLUCONATE, SODIUM ACETATE, POTASSIUM CHLORIDE, MAGNESIUM CHLORIDE, SODIUM PHOSPHATE, DIBASIC, AND POTASSIUM PHOSPHATE 200 ML/HR: .53; .5; .37; .037; .03; .012; .00082 INJECTION, SOLUTION INTRAVENOUS at 15:44

## 2022-02-16 RX ADMIN — HEPARIN SODIUM 5000 UNITS: 5000 INJECTION INTRAVENOUS; SUBCUTANEOUS at 13:50

## 2022-02-16 RX ADMIN — LEVOTHYROXINE SODIUM 50 MCG: 50 TABLET ORAL at 07:50

## 2022-02-16 RX ADMIN — VENLAFAXINE HYDROCHLORIDE 225 MG: 37.5 CAPSULE, EXTENDED RELEASE ORAL at 07:49

## 2022-02-16 RX ADMIN — POTASSIUM CHLORIDE 20 MEQ: 200 INJECTION, SOLUTION INTRAVENOUS at 10:14

## 2022-02-16 RX ADMIN — SODIUM CHLORIDE, SODIUM GLUCONATE, SODIUM ACETATE, POTASSIUM CHLORIDE, MAGNESIUM CHLORIDE, SODIUM PHOSPHATE, DIBASIC, AND POTASSIUM PHOSPHATE 200 ML/HR: .53; .5; .37; .037; .03; .012; .00082 INJECTION, SOLUTION INTRAVENOUS at 08:48

## 2022-02-16 RX ADMIN — SODIUM CHLORIDE, SODIUM GLUCONATE, SODIUM ACETATE, POTASSIUM CHLORIDE, MAGNESIUM CHLORIDE, SODIUM PHOSPHATE, DIBASIC, AND POTASSIUM PHOSPHATE 200 ML/HR: .53; .5; .37; .037; .03; .012; .00082 INJECTION, SOLUTION INTRAVENOUS at 22:20

## 2022-02-16 RX ADMIN — HEPARIN SODIUM 5000 UNITS: 5000 INJECTION INTRAVENOUS; SUBCUTANEOUS at 22:20

## 2022-02-16 NOTE — ASSESSMENT & PLAN NOTE
Noted on am labs; likely d/t sepsis/dehydration d/t diarrhea  Check UA   dc ARB (received on 2/14)  Creatinine improving

## 2022-02-16 NOTE — TELEPHONE ENCOUNTER
Bc Omer PA-C  [facility]; Salome Damian  Patient is currently hospitalized at [address], please arrange for outpatient EGD and colonoscopy in 6-8 weeks, she was seen by Dr Jojo Baxter in hospital   Thank you

## 2022-02-16 NOTE — PLAN OF CARE
Problem: PAIN - ADULT  Goal: Verbalizes/displays adequate comfort level or baseline comfort level  Description: Interventions:  - Encourage patient to monitor pain and request assistance  - Assess pain using appropriate pain scale  - Administer analgesics based on type and severity of pain and evaluate response  - Implement non-pharmacological measures as appropriate and evaluate response  - Consider cultural and social influences on pain and pain management  - Notify physician/advanced practitioner if interventions unsuccessful or patient reports new pain  Outcome: Progressing     Problem: INFECTION - ADULT  Goal: Absence or prevention of progression during hospitalization  Description: INTERVENTIONS:  - Assess and monitor for signs and symptoms of infection  - Monitor lab/diagnostic results  - Monitor all insertion sites, i e  indwelling lines, tubes, and drains  - Monitor endotracheal if appropriate and nasal secretions for changes in amount and color  - [place] appropriate cooling/warming therapies per order  - Administer medications as ordered  - Instruct and encourage patient and family to use good hand hygiene technique  - Identify and instruct in appropriate isolation precautions for identified infection/condition  Outcome: Progressing  Goal: Absence of fever/infection during neutropenic period  Description: INTERVENTIONS:  - Monitor WBC    Outcome: Progressing     Problem: SAFETY ADULT  Goal: Patient will remain free of falls  Description: INTERVENTIONS:  - Educate patient/family on patient safety including physical limitations  - Instruct patient to call for assistance with activity   - Consult OT/PT to assist with strengthening/mobility   - Keep Call bell within reach  - Keep bed low and locked with side rails adjusted as appropriate  - Keep care items and personal belongings within reach  - Initiate and maintain comfort rounds  - Make Fall Risk Sign visible to staff  - Apply yellow socks and bracelet for high fall risk patients  - Consider moving patient to room near nurses station  Outcome: Progressing  Goal: Maintain or return to baseline ADL function  Description: INTERVENTIONS:  -  Assess patient's ability to carry out ADLs; assess patient's baseline for ADL function and identify physical deficits which impact ability to perform ADLs (bathing, care of mouth/teeth, toileting, grooming, dressing, etc )  - Assess/evaluate cause of self-care deficits   - Assess range of motion  - Assess patient's mobility; develop plan if impaired  - Assess patient's need for assistive devices and provide as appropriate  - Encourage maximum independence but intervene and supervise when necessary  - Involve family in performance of ADLs  - Assess for home care needs following discharge   - Consider OT consult to assist with ADL evaluation and planning for discharge  - Provide patient education as appropriate  Outcome: Progressing  Goal: Maintains/Returns to pre admission functional level  Description: INTERVENTIONS:  - Perform BMAT or MOVE assessment daily    - Set and communicate daily mobility goal to care team and patient/family/caregiver     - Collaborate with rehabilitation services on mobility goals if consulted  - Out of bed for toileting  - Record patient progress and toleration of activity level   Outcome: Progressing     Problem: DISCHARGE PLANNING  Goal: Discharge to home or other facility with appropriate resources  Description: INTERVENTIONS:  - Identify barriers to discharge w/patient and caregiver  - Arrange for needed discharge resources and transportation as appropriate  - Identify discharge learning needs (meds, wound care, etc )  - Arrange for interpretive services to assist at discharge as needed  - Refer to Case Management Department for coordinating discharge planning if the patient needs post-hospital services based on physician/advanced practitioner order or complex needs related to functional status, cognitive ability, or social support system  Outcome: Progressing     Problem: Knowledge Deficit  Goal: Patient/family/caregiver demonstrates understanding of disease process, treatment plan, medications, and discharge instructions  Description: Complete learning assessment and assess knowledge base    Interventions:  - Provide teaching at level of understanding  - Provide teaching via preferred learning methods  Outcome: Progressing     Problem: GASTROINTESTINAL - ADULT  Goal: Minimal or absence of nausea and/or vomiting  Description: INTERVENTIONS:  - Administer IV fluids if ordered to ensure adequate hydration  - Maintain NPO status until nausea and vomiting are resolved  - Nasogastric tube if ordered  - Administer ordered antiemetic medications as needed  - Provide nonpharmacologic comfort measures as appropriate  - Advance diet as tolerated, if ordered  - Consider nutrition services referral to assist patient with adequate nutrition and appropriate food choices  Outcome: Progressing  Goal: Maintains or returns to baseline bowel function  Description: INTERVENTIONS:  - Assess bowel function  - Encourage oral fluids to ensure adequate hydration  - Administer IV fluids if ordered to ensure adequate hydration  - Administer ordered medications as needed  - Encourage mobilization and activity  - Consider nutritional services referral to assist patient with adequate nutrition and appropriate food choices  Outcome: Progressing  Goal: Maintains adequate nutritional intake  Description: INTERVENTIONS:  - Monitor percentage of each meal consumed  - Identify factors contributing to decreased intake, treat as appropriate  - Assist with meals as needed  - Monitor I&O, weight, and lab values if indicated  - Obtain nutrition services referral as needed  Outcome: Progressing  Goal: Establish and maintain optimal ostomy function  Description: INTERVENTIONS:  - Assess bowel function  - Encourage oral fluids to ensure adequate hydration  - Administer IV fluids if ordered to ensure adequate hydration   - Administer ordered medications as needed  - Encourage mobilization and activity  - Nutrition services referral to assist patient with appropriate food choices  - Assess stoma site  - Consider wound care consult   Outcome: Progressing  Goal: Oral mucous membranes remain intact  Description: INTERVENTIONS  - Assess oral mucosa and hygiene practices  - Implement preventative oral hygiene regimen  - Implement oral medicated treatments as ordered  - Initiate Nutrition services referral as needed  Outcome: Progressing     Problem: Prexisting or High Potential for Compromised Skin Integrity  Goal: Skin integrity is maintained or improved  Description: INTERVENTIONS:  - Identify patients at risk for skin breakdown  - Assess and monitor skin integrity  - Assess and monitor nutrition and hydration status  - Monitor labs   - Assess for incontinence   - Turn and reposition patient  - Assist with mobility/ambulation  - Relieve pressure over bony prominences  - Avoid friction and shearing  - Provide appropriate hygiene as needed including keeping skin clean and dry  - Evaluate need for skin moisturizer/barrier cream  - Collaborate with interdisciplinary team   - Patient/family teaching  - Consider wound care consult   Outcome: Progressing     Problem: Potential for Falls  Goal: Patient will remain free of falls  Description: INTERVENTIONS:  - Educate patient/family on patient safety including physical limitations  - Instruct patient to call for assistance with activity   - Consult OT/PT to assist with strengthening/mobility   - Keep Call bell within reach  - Keep bed low and locked with side rails adjusted as appropriate  - Keep care items and personal belongings within reach  - Initiate and maintain comfort rounds  - Make Fall Risk Sign visible to staff  - Apply yellow socks and bracelet for high fall risk patients  - Consider moving patient to room near nurses station  Outcome: Progressing

## 2022-02-16 NOTE — PROGRESS NOTES
[facility]  Progress Note - Jerrell Tmoas 1955, 73 y o  female MRN: [id number]  Unit/Bed#: S -66 Encounter: [id number]  Primary Care Provider: Rossy Sepulveda MD   Date and time admitted to hospital: 2/13/2022  7:15 AM    LEVY (acute kidney injury) [facility])  Assessment & Plan  Noted on am labs; likely d/t sepsis/dehydration d/t diarrhea  Check UA   dc ARB (received on 2/14)  Creatinine improving       Anxiety  Assessment & Plan  Per discussion with the patient, she is on venlafaxine as well as Ativan p r n  Pocahontas Mclaughlin venlafaxine 225 mg once a day  Continue these medications  Primary hypertension  Assessment & Plan  Blood pressure in the emergency room, initially high, but has gone down spontaneously  hold blood pressure meds for now given LEVY and relative hypotension   142/73    Acquired hypothyroidism  Assessment & Plan  Patient has history of constipation  Today, patient was also found to have hypothermia  TSH is mildly elevated   free T4/t3 wnl   As per discussion with the patient, she is compliant with her levothyroxine medication and takes itat the right time before breakfast   With symptoms of hypothyroidism, patient's dose of levothyroxine increased from 25 to 50 mcg once a day  Colitis  Assessment & Plan  History of constipation and had pellet stools yesterday  Patient came in due to abdominal pains at the suprapubic and left lower quadrant areas  Patient had diarrhea and nausea and vomiting here in the emergency room  No fever or chills  However patient was found to be hypothermic in the emergency room  Viral versus bacterial     enteric PCR, C difficile PCR/EIA:  Negative   Cont zosyn d3  If no improvement, consider GI consult as an inpatient  Pain control p r n  Fernando Mclaughlin Patient was also ordered with Bentyl p r n  For abdominal cramping  Please see plans under sepsis      * Sepsis [facility])  Assessment & Plan  Present on admission with hypothermia at 94 1° F, leukocytosis at 17,000  Elevated lactic acid level  Source:  Colitis; possible viral versus bacterial   On sepsis order set/protocol  cont IV Zosyn    Procalcitonin: 0 05-->36  BCx: NGTD  If no further evidence of a bacterial infection, consider discontinuing antibiotic  Will eventually need gastroenterologist referral, most specially for colonoscopy, likely as an outpatient; however, if no significant improvement of patient's GI symptoms/colitis, consider GI consult  IV fluids  LA still elevated but trending down; would check later today and if normal, stop trending  Continue Anamaria Hugger as needed for hypothermia  VTE Pharmacologic Prophylaxis: VTE Score: 4 Moderate Risk (Score 3-4) - Pharmacological DVT Prophylaxis Ordered: heparin  Patient Centered Rounds: I performed bedside rounds with nursing staff today  Discussions with Specialists or Other Care Team Provider: Discussed with patient and resident updated family  Education and Discussions with Family / Patient: called  -   Time Spent for Care: 30 minutes  More than 50% of total time spent on counseling and coordination of care as described above  Current Length of Stay: 3 day(s)  Current Patient Status: Inpatient   Certification Statement: The patient will continue to require additional inpatient hospital stay due to monitor on ABx  Discharge Plan: Anticipate discharge in 24-48 hrs to home  Code Status: Level 1 - Full Code    Subjective:   Patient seen and examined   No new issues   Objective:     Vitals:   Temp (24hrs), Av 3 °F (36 8 °C), Min:97 8 °F (36 6 °C), Max:99 °F (37 2 °C)    Temp:  [97 8 °F (36 6 °C)-99 °F (37 2 °C)] 99 °F (37 2 °C)  HR:  [72-85] 72  Resp:  [18] 18  BP: (133-151)/(67-83) 142/73  SpO2:  [93 %-95 %] 95 %  Body mass index is 21 21 kg/m²  Input and Output Summary (last 24 hours):      Intake/Output Summary (Last 24 hours) at 2022 1621  Last data filed at 2022 1544  Gross per 24 hour Intake 3240 ml   Output 4850 ml   Net -1610 ml       Physical Exam:   Physical Exam  Constitutional:       General: She is not in acute distress  Appearance: She is not ill-appearing, toxic-appearing or diaphoretic  HENT:      Head: Normocephalic  Mouth/Throat:      Mouth: Mucous membranes are moist    Eyes:      General: No scleral icterus  Right eye: No discharge  Left eye: No discharge  Pupils: Pupils are equal, round, and reactive to light  Cardiovascular:      Rate and Rhythm: Normal rate  Heart sounds: No murmur heard  No friction rub  No gallop  Pulmonary:      Effort: Pulmonary effort is normal  No respiratory distress  Breath sounds: No stridor  No wheezing, rhonchi or rales  Chest:      Chest wall: No tenderness  Abdominal:      General: There is no distension  Palpations: There is no mass  Tenderness: There is no abdominal tenderness  There is no right CVA tenderness, left CVA tenderness, guarding or rebound  Hernia: No hernia is present  Musculoskeletal:         General: No swelling or tenderness  Right lower leg: No edema  Left lower leg: No edema  Skin:     Capillary Refill: Capillary refill takes less than 2 seconds  Coloration: Skin is pale  Skin is not jaundiced  Findings: No erythema, lesion or rash  Neurological:      General: No focal deficit present  Mental Status: She is alert  Cranial Nerves: No cranial nerve deficit  Sensory: No sensory deficit  Motor: No weakness        Gait: Gait normal    Psychiatric:         Mood and Affect: Mood normal           Additional Data:     Labs:  Results from last 7 days   Lab Units 02/16/22  0622 02/15/22  1203 02/14/22  0417   WBC Thousand/uL 7 20   < > 16 91*   HEMOGLOBIN g/dL 11 7   < > 17 6*   HEMATOCRIT % 34 7*   < > 53 8*   PLATELETS Thousands/uL 179   < > 308   BANDS PCT % 12*   < >  --    NEUTROS PCT %  --   --  65   LYMPHS PCT %  --   -- 29   LYMPHO PCT % 21   < >  --    MONOS PCT %  --   --  5   MONO PCT % 5   < >  --    EOS PCT % 1   < > 0    < > = values in this interval not displayed  Results from last 7 days   Lab Units 02/16/22  0622 02/14/22  0417 02/13/22  0754   SODIUM mmol/L 139   < > 135*   POTASSIUM mmol/L 3 4*   < > 3 9   CHLORIDE mmol/L 106   < > 101   CO2 mmol/L 27   < > 21   BUN mg/dL 5   < > 19   CREATININE mg/dL 0 52*   < > 0 71   ANION GAP mmol/L 6   < > 13   CALCIUM mg/dL 7 6*   < > 10 0   ALBUMIN g/dL  --   --  4 3   TOTAL BILIRUBIN mg/dL  --   --  0 69   ALK PHOS U/L  --   --  102   ALT U/L  --   --  21   AST U/L  --   --  24   GLUCOSE RANDOM mg/dL 111   < > 191*    < > = values in this interval not displayed  Results from last 7 days   Lab Units 02/13/22  0827   INR  0 85             Results from last 7 days   Lab Units 02/14/22  0500 02/14/22  0305 02/14/22  0108 02/13/22  2310 02/13/22  2034 02/13/22  1052 02/13/22  0827   LACTIC ACID mmol/L  --  3 3* 4 3* 3 7* 3 3*   < >  --    PROCALCITONIN ng/ml 36 79*  --   --   --   --   --  <0 05    < > = values in this interval not displayed  Lines/Drains:  Invasive Devices  Report    Peripheral Intravenous Line            Peripheral IV 02/13/22 Right Antecubital 3 days                      Imaging: No pertinent imaging reviewed  Recent Cultures (last 7 days):   Results from last 7 days   Lab Units 02/13/22  1311 02/13/22  0822   BLOOD CULTURE   --  No Growth at 72 hrs  No Growth at 72 hrs     C DIFF TOXIN B BY PCR  Negative  --        Last 24 Hours Medication List:   Current Facility-Administered Medications   Medication Dose Route Frequency Provider Last Rate    acetaminophen  650 mg Oral Q6H PRN Suman Pillai MD      aspirin  81 mg Oral Once per day on Mon Wed Fri Mya Trinh MD      calcium carbonate  1 tablet Oral BID With [address] [place] [address] Dr David MD      cholecalciferol  400 Units Oral Daily Mya Trinh MD  co-enzyme Q-10  100 mg Oral Daily Suman Arizmendi MD      cyanocobalamin  100 mcg Oral Daily Suman Arizmendi MD      dicyclomine  10 mg Oral TID PRN Angelo Hernandez MD      heparin (porcine)  5,000 Units Subcutaneous [facility] Gail Heath MD      hydrALAZINE  5 mg Intravenous Q6H PRN Angelo Hernandez MD      HYDROmorphone  0 2 mg Intravenous Q3H PRN Angelo Hernandez MD      levothyroxine  50 mcg Oral Daily Suman Arizmendi MD      LORazepam  0 5 mg Oral Q12H PRN Angelo Hernandez MD      multi-electrolyte  200 mL/hr Intravenous Continuous Angelo Hernandez  mL/hr (02/16/22 1544)    multivitamin stress formula  1 tablet Oral Daily Angelo Hernandez MD      ondansetron  4 mg Intravenous Q6H PRN Angelo Hernandez MD      piperacillin-tazobactam  3 375 g Intravenous Q6H Angelo Hernandez MD 3 375 g (02/16/22 1200)    senna  1 tablet Oral HS PRN Angelo Hernandez MD      thiamine  100 mg Oral Daily Angelo Hernandez MD      venlafaxine  225 mg Oral Daily Susana Benton MD          Today, Patient Was Seen By: Sony Payne MD    **Please Note: This note may have been constructed using a voice recognition system  **

## 2022-02-16 NOTE — ASSESSMENT & PLAN NOTE
History of constipation and had pellet stools yesterday  Patient came in due to abdominal pains at the suprapubic and left lower quadrant areas  Patient had diarrhea and nausea and vomiting here in the emergency room  No fever or chills  However patient was found to be hypothermic in the emergency room  Viral versus bacterial     enteric PCR, C difficile PCR/EIA:  Negative   Cont zosyn d3  If no improvement, consider GI consult as an inpatient  Pain control p r n  Shireen Rahman Patient was also ordered with Bentyl p r n  For abdominal cramping  Please see plans under sepsis

## 2022-02-16 NOTE — CONSULTS
Consultation - [id number]  Carole Flowers 73 y o  female MRN: [id number]  Unit/Bed#: S -01 Encounter: [id number]        Consults    Reason for Consult / Principal Problem:  Colitis    HPI: Carole Flowers is a 73y o  year old female with history of hypothyroidism who presented to emergency room a few days ago on 02/13/2022, she said that she was experiencing abdominal cramping  She says that starting on Sunday morning (the day of her presentation), she had significant cramping in her suprapubic region, she tried to have a bowel movement, experienced cold sweats and could not go immediately  She says that she typically has constipation characterized by regular stools that are small and pellet-like, she admits that she does not think she drinks enough water and the stool tends to normalize when she corrects this issue  She does take Norvasc and Cozaar for her blood pressure, but she denies any recent changes to her antihypertensive regimen  She thinks the last meal she had before onset of symptoms was a Caesar salad and she thought the lettuce looked a little brown  She says she is feeling substantially better today, with relatively mild discomfort in the suprapubic region which is much less intense than it was at the onset of symptoms  She tolerated a meal of solid food this morning, the 1st meal she has had since this started  She tells me her last colonoscopy was about 5 years ago, and that she gets them typically about every 5 years  Unrelated to her current issue, she says that lately she has also been having some squeezing discomforts in her chest typically occurring in the middle of the night, she thinks she saw a cardiologist about this and it was not felt to be cardiac  She does not think she has had an EGD, she denies any known history of acid reflux or heartburn issues  Here, her CT scan showed evidence of pancolitis, especially affecting the left-sided colon    Stool C diff and enteric bacterial panel have returned negative  REVIEW OF SYSTEMS:    CONSTITUTIONAL: Denies any fever, chills, or rigors  Good appetite, and no recent weight loss  HEENT: No earache or tinnitus  Denies hearing loss or visual disturbances  CARDIOVASCULAR: No chest pain or palpitations  RESPIRATORY: Denies any cough, hemoptysis, shortness of breath or dyspnea on exertion  GASTROINTESTINAL: As noted in the History of Present Illness  GENITOURINARY: No problems with urination  Denies any hematuria or dysuria  NEUROLOGIC: No dizziness or vertigo, denies headaches  MUSCULOSKELETAL: Denies any muscle or joint pain  SKIN: Denies skin rashes or itching  ENDOCRINE: Denies excessive thirst  Denies intolerance to heat or cold  PSYCHOSOCIAL: Denies depression or anxiety  Denies any recent memory loss  Historical Information   Past Medical History:   Diagnosis Date    Asthma     Disease of thyroid gland     Hypertension     Psychiatric disorder      Past Surgical History:   Procedure Laterality Date    INGUINAL HERNIA REPAIR  2002    VASCULAR SURGERY      Vein removed from Left leg     Social History   Social History     Substance and Sexual Activity   Alcohol Use Yes    Alcohol/week: 3 0 standard drinks    Types: 3 Glasses of wine per week     Social History     Substance and Sexual Activity   Drug Use Not on file     Social History     Tobacco Use   Smoking Status Never Smoker   Smokeless Tobacco Never Used     History reviewed  No pertinent family history      Meds/Allergies     Medications Prior to Admission   Medication    ASPIRIN 81 PO    B Complex-C (B COMPLEX-VITAMIN C PO)    Calcium Carbonate (Calcium 600) 1500 (600 Ca) MG TABS    calcium carbonate (OS-KONSTANTIN) 1250 (500 Ca) MG chewable tablet    Coenzyme Q10 100 MG TABS    cyanocobalamin (VITAMIN B-12) 100 mcg tablet    Garlic 804 MG TABS    Grape Seed 50 MG TABS    Krill Oil 1000 MG CAPS    levothyroxine (Synthroid) 25 mcg tablet    LORazepam (ATIVAN) 0 5 mg tablet    losartan (Cozaar) 25 mg tablet    thiamine (VITAMIN B1) 100 mg tablet    venlafaxine (EFFEXOR) 100 MG tablet    Vitamin D, Cholecalciferol, 10 MCG (400 UNIT) CAPS     Current Facility-Administered Medications   Medication Dose Route Frequency    acetaminophen (TYLENOL) tablet 650 mg  650 mg Oral Q6H PRN    aspirin chewable tablet 81 mg  81 mg Oral Once per day on Mon Wed Fri    calcium carbonate (OYSTER SHELL,OSCAL) 500 mg tablet 1 tablet  1 tablet Oral BID With Meals    cholecalciferol (VITAMIN D3) tablet 400 Units  400 Units Oral Daily    co-enzyme Q-10 capsule 100 mg  100 mg Oral Daily    cyanocobalamin (VITAMIN B-12) tablet 100 mcg  100 mcg Oral Daily    dicyclomine (BENTYL) capsule 10 mg  10 mg Oral TID PRN    heparin (porcine) subcutaneous injection 5,000 Units  5,000 Units Subcutaneous Q8H [address]    hydrALAZINE (APRESOLINE) injection 5 mg  5 mg Intravenous Q6H PRN    HYDROmorphone (DILAUDID) injection 0 2 mg  0 2 mg Intravenous Q3H PRN    levothyroxine tablet 50 mcg  50 mcg Oral Daily    LORazepam (ATIVAN) tablet 0 5 mg  0 5 mg Oral Q12H PRN    multi-electrolyte (PLASMALYTE-A/ISOLYTE-S PH 7 4) IV solution  200 mL/hr Intravenous Continuous    multivitamin stress formula tablet 1 tablet  1 tablet Oral Daily    ondansetron (ZOFRAN) injection 4 mg  4 mg Intravenous Q6H PRN    piperacillin-tazobactam (ZOSYN) 3 375 g in sodium chloride 0 9 % 100 mL IVPB  3 375 g Intravenous Q6H    potassium chloride 20 mEq IVPB (premix)  20 mEq Intravenous Once    senna (SENOKOT) tablet 8 6 mg  1 tablet Oral HS PRN    thiamine tablet 100 mg  100 mg Oral Daily    venlafaxine (EFFEXOR-XR) 24 hr capsule 225 mg  225 mg Oral Daily       Allergies   Allergen Reactions    Codeine Anxiety           Objective     Blood pressure 148/83, pulse 85, temperature 98 1 °F (36 7 °C), temperature source Oral, resp   rate 18, height 4' 11" (1 499 m), weight 47 6 kg (105 lb), SpO2 94 %       Intake/Output Summary (Last 24 hours) at 2/16/2022 1101  Last data filed at 2/16/2022 1014  Gross per 24 hour   Intake 6073 33 ml   Output 4975 ml   Net 1098 33 ml         PHYSICAL EXAM     General Appearance:   Alert, cooperative, no distress, appears stated age    HEENT:   Normocephalic, atraumatic, anicteric      Neck:  Supple, symmetrical, trachea midline, no adenopathy;    thyroid: no enlargement/tenderness/nodules; no carotid  bruit or JVD    Lungs:   Clear to auscultation bilaterally; no rales, rhonchi or wheezing; respirations unlabored    Heart[de-identified]   S1 and S2 normal; regular rate and rhythm; no murmur, rub, or gallop  Abdomen:   Soft, non-tender, non-distended; normal bowel sounds; no masses, no organomegaly    Genitalia:   Deferred    Rectal:   Deferred    Extremities:  No cyanosis, clubbing or edema    Pulses:  2+ and symmetric all extremities    Skin:  Skin color, texture, turgor normal, no rashes or lesions    Lymph nodes:  No palpable cervical, axillary or inguinal lymphadenopathy        Lab Results:   Admission on 02/13/2022   Component Date Value    WBC 02/13/2022 17 72*    RBC 02/13/2022 5 97*    Hemoglobin 02/13/2022 17 1*    Hematocrit 02/13/2022 51 4*    MCV 02/13/2022 86     MCH 02/13/2022 28 6     MCHC 02/13/2022 33 3     RDW 02/13/2022 12 7     MPV 02/13/2022 10 5     Platelets 92/68/9819 295     Sodium 02/13/2022 135*    Potassium 02/13/2022 3 9     Chloride 02/13/2022 101     CO2 02/13/2022 21     ANION GAP 02/13/2022 13     BUN 02/13/2022 19     Creatinine 02/13/2022 0 71     Glucose 02/13/2022 191*    Calcium 02/13/2022 10 0     AST 02/13/2022 24     ALT 02/13/2022 21     Alkaline Phosphatase 02/13/2022 102     Total Protein 02/13/2022 7 6     Albumin 02/13/2022 4 3     Total Bilirubin 02/13/2022 0 69     eGFR 02/13/2022 89     TSH 3RD GENERATON 02/13/2022 7 341*    Blood Culture 02/13/2022 No Growth at 48 hrs       Blood Culture 02/13/2022 No Growth at 48 hrs       LACTIC ACID 02/13/2022 3 7*    Color, UA 02/13/2022 Yellow     Clarity, UA 02/13/2022 Clear     Specific Gravity, UA 02/13/2022 1 015     pH, UA 02/13/2022 7 0     Leukocytes, UA 02/13/2022 Negative     Nitrite, UA 02/13/2022 Negative     Protein, UA 02/13/2022 Negative     Glucose, UA 02/13/2022 100 (1/10%)*    Ketones, UA 02/13/2022 15 (1+)*    Urobilinogen, UA 02/13/2022 0 2     Bilirubin, UA 02/13/2022 Negative     Blood, UA 02/13/2022 Negative     Procalcitonin 02/13/2022 <0 05     Protime 02/13/2022 11 7     INR 02/13/2022 0 85     PTT 02/13/2022 30     Segmented % 02/13/2022 73     Lymphocytes % 02/13/2022 22     Monocytes % 02/13/2022 4     Eosinophils, % 02/13/2022 1     Basophils % 02/13/2022 0     Absolute Neutrophils 02/13/2022 12 94*    Lymphocytes Absolute 02/13/2022 3 90     Monocytes Absolute 02/13/2022 0 71     Eosinophils Absolute 02/13/2022 0 18     Basophils Absolute 02/13/2022 0 00     RBC Morphology 02/13/2022 Normal     Platelet Estimate 10/08/7127 Adequate     Free T4 02/13/2022 1 08     LACTIC ACID 02/13/2022 3 2*    Procalcitonin 02/14/2022 36 79*    Salmonella sp PCR 02/13/2022 None Detected     Shigella sp/Enteroinvasi* 02/13/2022 None Detected     Campylobacter sp (jejuni* 02/13/2022 None Detected     Shiga toxin 1/Shiga toxi* 02/13/2022 None Detected     LACTIC ACID 02/13/2022 4 3*    C difficile toxin by PCR 02/13/2022 Negative     T3, Free 02/13/2022 2 35     Platelets 88/16/5087 341     MPV 02/13/2022 9 4     LACTIC ACID 02/13/2022 4 5*    Ventricular Rate 02/13/2022 62     Atrial Rate 02/13/2022 62     SD Interval 02/13/2022 162     QRSD Interval 02/13/2022 82     QT Interval 02/13/2022 520     QTC Interval 02/13/2022 529     P Axis 02/13/2022 73     QRS Axis 02/13/2022 51     T Wave Axis 02/13/2022 96     Ventricular Rate 02/13/2022 60     Atrial Rate 02/13/2022 60     SD Interval 02/13/2022 168     QRSD Interval 02/13/2022 94     QT Interval 02/13/2022 464     QTC Interval 02/13/2022 464     P Axis 02/13/2022 75     QRS Axis 02/13/2022 28     T Wave Axis 02/13/2022 11     LACTIC ACID 02/13/2022 3 3*    LACTIC ACID 02/13/2022 3 7*    LACTIC ACID 02/14/2022 4 3*    LACTIC ACID 02/14/2022 3 3*    Sodium 02/14/2022 136     Potassium 02/14/2022 4 8     Chloride 02/14/2022 102     CO2 02/14/2022 21     ANION GAP 02/14/2022 13     BUN 02/14/2022 28*    Creatinine 02/14/2022 1 48*    Glucose 02/14/2022 160*    Calcium 02/14/2022 8 5     eGFR 02/14/2022 36     WBC 02/14/2022 16 91*    RBC 02/14/2022 6 10*    Hemoglobin 02/14/2022 17 6*    Hematocrit 02/14/2022 53 8*    MCV 02/14/2022 88     MCH 02/14/2022 28 9     MCHC 02/14/2022 32 7     RDW 02/14/2022 13 2     MPV 02/14/2022 9 3     Platelets 44/55/4367 308     nRBC 02/14/2022 0     Neutrophils Relative 02/14/2022 65     Immat GRANS % 02/14/2022 1     Lymphocytes Relative 02/14/2022 29     Monocytes Relative 02/14/2022 5     Eosinophils Relative 02/14/2022 0     Basophils Relative 02/14/2022 0     Neutrophils Absolute 02/14/2022 11 05*    Immature Grans Absolute 02/14/2022 0 12     Lymphocytes Absolute 02/14/2022 4 84*    Monocytes Absolute 02/14/2022 0 88     Eosinophils Absolute 02/14/2022 0 00     Basophils Absolute 02/14/2022 0 02     Magnesium 02/14/2022 2 4     Phosphorus 02/14/2022 6 6*    Sodium 02/15/2022 138     Potassium 02/15/2022 3 2*    Chloride 02/15/2022 104     CO2 02/15/2022 25     ANION GAP 02/15/2022 9     BUN 02/15/2022 18     Creatinine 02/15/2022 0 79     Glucose 02/15/2022 93     Calcium 02/15/2022 7 9*    eGFR 02/15/2022 78     WBC 02/15/2022 9 51     RBC 02/15/2022 4 73     Hemoglobin 02/15/2022 13 8     Hematocrit 02/15/2022 40 7     MCV 02/15/2022 86     MCH 02/15/2022 29 2     MCHC 02/15/2022 33 9     RDW 02/15/2022 13 1     MPV 02/15/2022 9 7     Platelets 44/72/2571 200     Segmented % 02/15/2022 49     Bands % 02/15/2022 14*    Lymphocytes % 02/15/2022 28     Monocytes % 02/15/2022 4     Eosinophils, % 02/15/2022 0     Basophils % 02/15/2022 0     Atypical Lymphocytes % 02/15/2022 5*    Absolute Neutrophils 02/15/2022 5 99     Lymphocytes Absolute 02/15/2022 2 66     Monocytes Absolute 02/15/2022 0 38     Eosinophils Absolute 02/15/2022 0 00     Basophils Absolute 02/15/2022 0 00     Toxic Vacuolated Neutrop* 02/15/2022 Present     RBC Morphology 02/15/2022 Normal     Platelet Estimate 28/52/4453 Adequate     Sodium 02/16/2022 139     Potassium 02/16/2022 3 4*    Chloride 02/16/2022 106     CO2 02/16/2022 27     ANION GAP 02/16/2022 6     BUN 02/16/2022 5     Creatinine 02/16/2022 0 52*    Glucose 02/16/2022 111     Calcium 02/16/2022 7 6*    eGFR 02/16/2022 100     WBC 02/16/2022 7 20     RBC 02/16/2022 3 93     Hemoglobin 02/16/2022 11 7     Hematocrit 02/16/2022 34 7*    MCV 02/16/2022 88     MCH 02/16/2022 29 8     MCHC 02/16/2022 33 7     RDW 02/16/2022 12 8     MPV 02/16/2022 9 4     Platelets 88/03/6083 179     Segmented % 02/16/2022 58     Bands % 02/16/2022 12*    Lymphocytes % 02/16/2022 21     Monocytes % 02/16/2022 5     Eosinophils, % 02/16/2022 1     Basophils % 02/16/2022 1     Atypical Lymphocytes % 02/16/2022 2*    Absolute Neutrophils 02/16/2022 5 04     Lymphocytes Absolute 02/16/2022 1 51     Monocytes Absolute 02/16/2022 0 36     Eosinophils Absolute 02/16/2022 0 07     Basophils Absolute 02/16/2022 0 07     RBC Morphology 02/16/2022 Normal     Platelet Estimate 98/81/2786 Adequate        Imaging Studies: I have personally reviewed pertinent reports  CT ABDOMEN AND PELVIS WITH IV CONTRAST     INDICATION:   RLQ abdominal pain (Age >= 14y)  abdominal pain + extremem urge to defecate    Abdominal pain, nausea, and vomiting, unable to pass stool     COMPARISON:  None      TECHNIQUE:  CT examination of the abdomen and pelvis was performed  Axial, sagittal, and coronal 2D reformatted images were created from the source data and submitted for interpretation      Radiation dose length product (DLP) for this visit:  308 mGy-cm   This examination, like all CT scans performed in the [facility], was performed utilizing techniques to minimize radiation dose exposure, including the use of iterative   reconstruction and automated exposure control      IV Contrast:  100 mL of iohexol (OMNIPAQUE)  Enteric Contrast:  Enteric contrast was not administered      FINDINGS:     ABDOMEN     LOWER CHEST:  No clinically significant abnormality identified in the visualized lower chest      LIVER/BILIARY TREE:  One or more subcentimeter sharply circumscribed low-density hepatic lesion(s) are noted, too small to accurately characterize, but statistically most likely to represent subcentimeter hepatic cysts  No suspicious solid hepatic   lesion is identified  Hepatic contours are normal   No biliary dilatation      GALLBLADDER:  No calcified gallstones  No pericholecystic inflammatory change      SPLEEN:  Unremarkable      PANCREAS:  Unremarkable      ADRENAL GLANDS:  Unremarkable      KIDNEYS/URETERS:  No hydronephrosis or urinary tract calculus  One or more sharply circumscribed subcentimeter renal hypodensities are present, too small to accurately characterize, and statistically most likely benign findings  According to recent   literature (Radiology 2019) no further workup of these findings is recommended      STOMACH AND BOWEL:  There is pancolonic wall thickening, most severely affecting the descending and sigmoid colon, consistent with pancolitis  There is marked mesenteric edema adjacent to the more severely affected descending and sigmoid colon  No   pneumatosis identified  There is no small bowel dilatation    There is stool in the region of the rectum      APPENDIX:  No findings to suggest appendicitis      ABDOMINOPELVIC CAVITY: No ascites  No pneumoperitoneum  No lymphadenopathy      VESSELS:  Unremarkable for patient's age      PELVIS     REPRODUCTIVE ORGANS:  Unremarkable for patient's age      URINARY BLADDER:  Unremarkable      ABDOMINAL WALL/INGUINAL REGIONS:  Unremarkable      OSSEOUS STRUCTURES:  No acute fracture or destructive osseous lesion      IMPRESSION:     Pancolitis, most likely infectious  The descending and sigmoid colon are most severely affected with marked adjacent mesenteric edema  No pneumatosis identified  ASSESSMENT/PLAN:     1  Acute pancolitis, suspect to be related to infectious etiology/food-borne illness, her enteric panel returned negative but less than 100% sensitivity of these tests is acknowledged  She does appear to be clinically improving in any case, has been receiving appropriate treatment with bowel rest and prophylactic antibiotics  Should also rule out underlying colorectal lesions or less likely inflammatory bowel disease    -recommend outpatient colonoscopy in several weeks, can also perform EGD at the same time as patient has been reporting some nocturnal chest pressure symptomatology which may represent GERD    - continue antibiotics    -continue low residue diet    - if patient develops rectal bleeding or persistent diarrhea despite conservative management strategies outlined above, can consider inpatient colonoscopy but otherwise would plan for outpatient as described    The patient was seen and examined by Dr Rios Carney, all chaudhary medical decisions were made with Dr Rios Carney  Thank you for allowing us to participate in the care of this pleasant patient  We will follow up with you closely

## 2022-02-16 NOTE — ASSESSMENT & PLAN NOTE
Per discussion with the patient, she is on venlafaxine as well as Ativan p r reed Cárdenas venlafaxine 225 mg once a day  Continue these medications

## 2022-02-16 NOTE — PLAN OF CARE
Problem: PAIN - ADULT  Goal: Verbalizes/displays adequate comfort level or baseline comfort level  Description: Interventions:  - Encourage patient to monitor pain and request assistance  - Assess pain using appropriate pain scale  - Administer analgesics based on type and severity of pain and evaluate response  - Implement non-pharmacological measures as appropriate and evaluate response  - Consider cultural and social influences on pain and pain management  - Notify physician/advanced practitioner if interventions unsuccessful or patient reports new pain  Outcome: Progressing     Problem: INFECTION - ADULT  Goal: Absence or prevention of progression during hospitalization  Description: INTERVENTIONS:  - Assess and monitor for signs and symptoms of infection  - Monitor lab/diagnostic results  - Monitor all insertion sites, i e  indwelling lines, tubes, and drains  - Monitor endotracheal if appropriate and nasal secretions for changes in amount and color  - [place] appropriate cooling/warming therapies per order  - Administer medications as ordered  - Instruct and encourage patient and family to use good hand hygiene technique  - Identify and instruct in appropriate isolation precautions for identified infection/condition  Outcome: Progressing  Goal: Absence of fever/infection during neutropenic period  Description: INTERVENTIONS:  - Monitor WBC    Outcome: Progressing     Problem: SAFETY ADULT  Goal: Patient will remain free of falls  Description: INTERVENTIONS:  - Educate patient/family on patient safety including physical limitations  - Instruct patient to call for assistance with activity   - Consult OT/PT to assist with strengthening/mobility   - Keep Call bell within reach  - Keep bed low and locked with side rails adjusted as appropriate  - Keep care items and personal belongings within reach  - Initiate and maintain comfort rounds  - Make Fall Risk Sign visible to staff  - Offer Toileting every  Hours, in advance of need  - Initiate/Maintain alarm  - Obtain necessary fall risk management equipment:   - Apply yellow socks and bracelet for high fall risk patients  - Consider moving patient to room near nurses station  Outcome: Progressing  Goal: Maintain or return to baseline ADL function  Description: INTERVENTIONS:  -  Assess patient's ability to carry out ADLs; assess patient's baseline for ADL function and identify physical deficits which impact ability to perform ADLs (bathing, care of mouth/teeth, toileting, grooming, dressing, etc )  - Assess/evaluate cause of self-care deficits   - Assess range of motion  - Assess patient's mobility; develop plan if impaired  - Assess patient's need for assistive devices and provide as appropriate  - Encourage maximum independence but intervene and supervise when necessary  - Involve family in performance of ADLs  - Assess for home care needs following discharge   - Consider OT consult to assist with ADL evaluation and planning for discharge  - Provide patient education as appropriate  Outcome: Progressing  Goal: Maintains/Returns to pre admission functional level  Description: INTERVENTIONS:  - Perform BMAT or MOVE assessment daily    - Set and communicate daily mobility goal to care team and patient/family/caregiver  - Collaborate with rehabilitation services on mobility goals if consulted  - Perform Range of Motion  times a day  - Reposition patient every  hours    - Dangle patient  times a day  - Stand patient  times a day  - Ambulate patient  times a day  - Out of bed to chair  times a day   - Out of bed for meals times a day  - Out of bed for toileting  - Record patient progress and toleration of activity level   Outcome: Progressing     Problem: DISCHARGE PLANNING  Goal: Discharge to home or other facility with appropriate resources  Description: INTERVENTIONS:  - Identify barriers to discharge w/patient and caregiver  - Arrange for needed discharge resources and transportation as appropriate  - Identify discharge learning needs (meds, wound care, etc )  - Arrange for interpretive services to assist at discharge as needed  - Refer to Case Management Department for coordinating discharge planning if the patient needs post-hospital services based on physician/advanced practitioner order or complex needs related to functional status, cognitive ability, or social support system  Outcome: Progressing     Problem: Knowledge Deficit  Goal: Patient/family/caregiver demonstrates understanding of disease process, treatment plan, medications, and discharge instructions  Description: Complete learning assessment and assess knowledge base    Interventions:  - Provide teaching at level of understanding  - Provide teaching via preferred learning methods  Outcome: Progressing     Problem: GASTROINTESTINAL - ADULT  Goal: Minimal or absence of nausea and/or vomiting  Description: INTERVENTIONS:  - Administer IV fluids if ordered to ensure adequate hydration  - Maintain NPO status until nausea and vomiting are resolved  - Nasogastric tube if ordered  - Administer ordered antiemetic medications as needed  - Provide nonpharmacologic comfort measures as appropriate  - Advance diet as tolerated, if ordered  - Consider nutrition services referral to assist patient with adequate nutrition and appropriate food choices  Outcome: Progressing  Goal: Maintains or returns to baseline bowel function  Description: INTERVENTIONS:  - Assess bowel function  - Encourage oral fluids to ensure adequate hydration  - Administer IV fluids if ordered to ensure adequate hydration  - Administer ordered medications as needed  - Encourage mobilization and activity  - Consider nutritional services referral to assist patient with adequate nutrition and appropriate food choices  Outcome: Progressing  Goal: Maintains adequate nutritional intake  Description: INTERVENTIONS:  - Monitor percentage of each meal consumed  - Identify factors contributing to decreased intake, treat as appropriate  - Assist with meals as needed  - Monitor I&O, weight, and lab values if indicated  - Obtain nutrition services referral as needed  Outcome: Progressing  Goal: Establish and maintain optimal ostomy function  Description: INTERVENTIONS:  - Assess bowel function  - Encourage oral fluids to ensure adequate hydration  - Administer IV fluids if ordered to ensure adequate hydration   - Administer ordered medications as needed  - Encourage mobilization and activity  - Nutrition services referral to assist patient with appropriate food choices  - Assess stoma site  - Consider wound care consult   Outcome: Progressing  Goal: Oral mucous membranes remain intact  Description: INTERVENTIONS  - Assess oral mucosa and hygiene practices  - Implement preventative oral hygiene regimen  - Implement oral medicated treatments as ordered  - Initiate Nutrition services referral as needed  Outcome: Progressing     Problem: Prexisting or High Potential for Compromised Skin Integrity  Goal: Skin integrity is maintained or improved  Description: INTERVENTIONS:  - Identify patients at risk for skin breakdown  - Assess and monitor skin integrity  - Assess and monitor nutrition and hydration status  - Monitor labs   - Assess for incontinence   - Turn and reposition patient  - Assist with mobility/ambulation  - Relieve pressure over bony prominences  - Avoid friction and shearing  - Provide appropriate hygiene as needed including keeping skin clean and dry  - Evaluate need for skin moisturizer/barrier cream  - Collaborate with interdisciplinary team   - Patient/family teaching  - Consider wound care consult   Outcome: Progressing     Problem: Potential for Falls  Goal: Patient will remain free of falls  Description: INTERVENTIONS:  - Educate patient/family on patient safety including physical limitations  - Instruct patient to call for assistance with activity   - Consult OT/PT to assist with strengthening/mobility   - Keep Call bell within reach  - Keep bed low and locked with side rails adjusted as appropriate  - Keep care items and personal belongings within reach  - Initiate and maintain comfort rounds  - Make Fall Risk Sign visible to staff  - Offer Toileting every  Hours, in advance of need  - Initiate/Maintain alarm  - Obtain necessary fall risk management equipment:   - Apply yellow socks and bracelet for high fall risk patients  - Consider moving patient to room near nurses station  Outcome: Progressing

## 2022-02-16 NOTE — ASSESSMENT & PLAN NOTE
Blood pressure in the emergency room, initially high, but has gone down spontaneously    hold blood pressure meds for now given LEVY and relative hypotension   142/73

## 2022-02-17 LAB
ANION GAP SERPL CALCULATED.3IONS-SCNC: 6 MMOL/L (ref 4–13)
BASOPHILS # BLD MANUAL: 0 THOUSAND/UL (ref 0–0.1)
BASOPHILS NFR MAR MANUAL: 0 % (ref 0–1)
BUN SERPL-MCNC: 4 MG/DL (ref 5–25)
CALCIUM SERPL-MCNC: 7.6 MG/DL (ref 8.3–10.1)
CHLORIDE SERPL-SCNC: 107 MMOL/L (ref 100–108)
CO2 SERPL-SCNC: 26 MMOL/L (ref 21–32)
CREAT SERPL-MCNC: 0.39 MG/DL (ref 0.6–1.3)
EOSINOPHIL # BLD MANUAL: 0.15 THOUSAND/UL (ref 0–0.4)
EOSINOPHIL NFR BLD MANUAL: 2 % (ref 0–6)
ERYTHROCYTE [DISTWIDTH] IN BLOOD BY AUTOMATED COUNT: 13 % (ref 11.6–15.1)
GFR SERPL CREATININE-BSD FRML MDRD: 110 ML/MIN/1.73SQ M
GLUCOSE SERPL-MCNC: 94 MG/DL (ref 65–140)
HCT VFR BLD AUTO: 33.4 % (ref 34.8–46.1)
HGB BLD-MCNC: 11 G/DL (ref 11.5–15.4)
LYMPHOCYTES # BLD AUTO: 1.51 THOUSAND/UL (ref 0.6–4.47)
LYMPHOCYTES # BLD AUTO: 20 % (ref 14–44)
MCH RBC QN AUTO: 28.9 PG (ref 26.8–34.3)
MCHC RBC AUTO-ENTMCNC: 32.9 G/DL (ref 31.4–37.4)
MCV RBC AUTO: 88 FL (ref 82–98)
MONOCYTES # BLD AUTO: 1.43 THOUSAND/UL (ref 0–1.22)
MONOCYTES NFR BLD: 19 % (ref 4–12)
MYELOCYTES NFR BLD MANUAL: 4 % (ref 0–1)
NEUTROPHILS # BLD MANUAL: 4.07 THOUSAND/UL (ref 1.85–7.62)
NEUTS SEG NFR BLD AUTO: 54 % (ref 43–75)
PLATELET # BLD AUTO: 187 THOUSANDS/UL (ref 149–390)
PLATELET BLD QL SMEAR: ADEQUATE
PMV BLD AUTO: 10.3 FL (ref 8.9–12.7)
POTASSIUM SERPL-SCNC: 3 MMOL/L (ref 3.5–5.3)
RBC # BLD AUTO: 3.8 MILLION/UL (ref 3.81–5.12)
RBC MORPH BLD: NORMAL
SODIUM SERPL-SCNC: 139 MMOL/L (ref 136–145)
VARIANT LYMPHS # BLD AUTO: 1 %
WBC # BLD AUTO: 7.54 THOUSAND/UL (ref 4.31–10.16)

## 2022-02-17 PROCEDURE — 99232 SBSQ HOSP IP/OBS MODERATE 35: CPT | Performed by: INTERNAL MEDICINE

## 2022-02-17 PROCEDURE — 85027 COMPLETE CBC AUTOMATED: CPT | Performed by: HOSPITALIST

## 2022-02-17 PROCEDURE — 85007 BL SMEAR W/DIFF WBC COUNT: CPT | Performed by: HOSPITALIST

## 2022-02-17 PROCEDURE — 80048 BASIC METABOLIC PNL TOTAL CA: CPT | Performed by: HOSPITALIST

## 2022-02-17 RX ORDER — VENLAFAXINE HYDROCHLORIDE 37.5 MG/1
225 CAPSULE, EXTENDED RELEASE ORAL DAILY
Status: DISCONTINUED | OUTPATIENT
Start: 2022-02-17 | End: 2022-02-18 | Stop reason: HOSPADM

## 2022-02-17 RX ORDER — POTASSIUM CHLORIDE 20 MEQ/1
40 TABLET, EXTENDED RELEASE ORAL ONCE
Status: COMPLETED | OUTPATIENT
Start: 2022-02-17 | End: 2022-02-17

## 2022-02-17 RX ORDER — POTASSIUM CHLORIDE 14.9 MG/ML
20 INJECTION INTRAVENOUS ONCE
Status: COMPLETED | OUTPATIENT
Start: 2022-02-17 | End: 2022-02-17

## 2022-02-17 RX ADMIN — PIPERACILLIN AND TAZOBACTAM 3.38 G: 36; 4.5 INJECTION, POWDER, FOR SOLUTION INTRAVENOUS at 22:03

## 2022-02-17 RX ADMIN — LEVOTHYROXINE SODIUM 50 MCG: 50 TABLET ORAL at 08:46

## 2022-02-17 RX ADMIN — PIPERACILLIN AND TAZOBACTAM 3.38 G: 36; 4.5 INJECTION, POWDER, FOR SOLUTION INTRAVENOUS at 04:01

## 2022-02-17 RX ADMIN — VENLAFAXINE HYDROCHLORIDE 225 MG: 37.5 CAPSULE, EXTENDED RELEASE ORAL at 09:50

## 2022-02-17 RX ADMIN — SODIUM CHLORIDE, SODIUM GLUCONATE, SODIUM ACETATE, POTASSIUM CHLORIDE, MAGNESIUM CHLORIDE, SODIUM PHOSPHATE, DIBASIC, AND POTASSIUM PHOSPHATE 200 ML/HR: .53; .5; .37; .037; .03; .012; .00082 INJECTION, SOLUTION INTRAVENOUS at 05:21

## 2022-02-17 RX ADMIN — HEPARIN SODIUM 5000 UNITS: 5000 INJECTION INTRAVENOUS; SUBCUTANEOUS at 05:30

## 2022-02-17 RX ADMIN — POTASSIUM CHLORIDE 20 MEQ: 14.9 INJECTION, SOLUTION INTRAVENOUS at 11:31

## 2022-02-17 RX ADMIN — POTASSIUM CHLORIDE 40 MEQ: 1500 TABLET, EXTENDED RELEASE ORAL at 10:49

## 2022-02-17 RX ADMIN — PIPERACILLIN AND TAZOBACTAM 3.38 G: 36; 4.5 INJECTION, POWDER, FOR SOLUTION INTRAVENOUS at 16:34

## 2022-02-17 RX ADMIN — HEPARIN SODIUM 5000 UNITS: 5000 INJECTION INTRAVENOUS; SUBCUTANEOUS at 14:26

## 2022-02-17 RX ADMIN — PIPERACILLIN AND TAZOBACTAM 3.38 G: 36; 4.5 INJECTION, POWDER, FOR SOLUTION INTRAVENOUS at 10:42

## 2022-02-17 RX ADMIN — HEPARIN SODIUM 5000 UNITS: 5000 INJECTION INTRAVENOUS; SUBCUTANEOUS at 21:36

## 2022-02-17 NOTE — PROGRESS NOTES
Progress Note - Allyson Cerna 73 y o  female MRN: [id number]    Unit/Bed#: S -25 Encounter: [id number]        Subjective:   Patient reports feeling better today, denies any abdominal pain currently, says she had 1 small episode of diarrhea without bleeding this morning  Reports to be tolerating diet  Objective:     Vitals: Blood pressure 157/85, pulse 79, temperature 98 1 °F (36 7 °C), temperature source Oral, resp  rate 16, height 4' 11" (1 499 m), weight 47 6 kg (105 lb), SpO2 97 %  ,Body mass index is 21 21 kg/m²  Intake/Output Summary (Last 24 hours) at 2/17/2022 1414  Last data filed at 2/17/2022 1033  Gross per 24 hour   Intake 3730 ml   Output 3500 ml   Net 230 ml       Physical Exam:   General appearance: alert, appears stated age and cooperative  Lungs: clear to auscultation bilaterally, no labored breathing/accessory muscle use  Heart: regular rate and rhythm, S1, S2 normal, no murmur, click, rub or gallop  Abdomen: soft, non-tender; bowel sounds normal; no masses,  no organomegaly  Extremities: no edema    Invasive Devices  Report    Peripheral Intravenous Line            Peripheral IV 02/13/22 Right Antecubital 4 days                Lab, Imaging and other studies: I have personally reviewed pertinent reports  No results displayed because visit has over 200 results  Results for Paul Davison (MRN [id number]) as of 2/17/2022 14:14   Ref   Range 2/15/2022 12:03 2/16/2022 06:22 2/17/2022 05:03   Sodium Latest Ref Range: 136 - 145 mmol/L  139 139   Potassium Latest Ref Range: 3 5 - 5 3 mmol/L  3 4 (L) 3 0 (L)   Chloride Latest Ref Range: 100 - 108 mmol/L  106 107   CO2 Latest Ref Range: 21 - 32 mmol/L  27 26   Anion Gap Latest Ref Range: 4 - 13 mmol/L  6 6   BUN Latest Ref Range: 5 - 25 mg/dL  5 4 (L)   Creatinine Latest Ref Range: 0 60 - 1 30 mg/dL  0 52 (L) 0 39 (L)   Glucose, Random Latest Ref Range: 65 - 140 mg/dL  111 94   Calcium Latest Ref Range: 8 3 - 10 1 mg/dL  7 6 (L) 7 6 (L)   eGFR Latest Units: ml/min/1 73sq m  100 110   WBC Latest Ref Range: 4 31 - 10 16 Thousand/uL 9 51 7 20 7 54   Red Blood Cell Count Latest Ref Range: 3 81 - 5 12 Million/uL 4 73 3 93 3 80 (L)   Hemoglobin Latest Ref Range: 11 5 - 15 4 g/dL 13 8 11 7 11 0 (L)   HCT Latest Ref Range: 34 8 - 46 1 % 40 7 34 7 (L) 33 4 (L)   MCV Latest Ref Range: 82 - 98 fL 86 88 88   MCH Latest Ref Range: 26 8 - 34 3 pg 29 2 29 8 28 9   MCHC Latest Ref Range: 31 4 - 37 4 g/dL 33 9 33 7 32 9   RDW Latest Ref Range: 11 6 - 15 1 % 13 1 12 8 13 0   Platelet Count Latest Ref Range: 149 - 390 Thousands/uL 200 179 187   MPV Latest Ref Range: 8 9 - 12 7 fL 9 7 9 4 10 3   Platelet Estimate Latest Ref Range: Adequate  Adequate Adequate Adequate   Segs Relative Latest Ref Range: 43 - 75 % 49 58 54   Abs Neutrophils Latest Ref Range: 1 85 - 7 62 Thousand/uL 5 99 5 04 4 07   Bands Relative Latest Ref Range: 0 - 8 % 14 (H) 12 (H)    Lymphocytes % Latest Ref Range: 14 - 44 % 28 21 20   Monocytes Latest Ref Range: 4 - 12 % 4 5 19 (H)   Eosinophils Latest Ref Range: 0 - 6 % 0 1 2   Basophils Relative Latest Ref Range: 0 - 1 % 0 1 0   Myelocytes % Latest Ref Range: 0 - 1 %   4 (H)   Atypical Lymphocytes % Latest Ref Range: <=0 % 5 (H) 2 (H) 1 (H)   Absolute Eosinophils Latest Ref Range: 0 00 - 0 40 Thousand/uL 0 00 0 07 0 15   Basophils Absolute Latest Ref Range: 0 00 - 0 10 Thousand/uL 0 00 0 07 0 00   RBC Morphology Unknown Normal Normal Normal   VACUOLATED Unknown Present     LYMPHOCYTES ABSOLUTE Latest Ref Range: 0 60 - 4 47 Thousand/uL 2 66 1 51 1 51   Monocytes Absolute Latest Ref Range: 0 00 - 1 22 Thousand/uL 0 38 0 36 1 43 (H)         Assessment/Plan:    1   Acute pancolitis, appears to be most likely infectious, and appears to be clinically improving at this time    -complete regimen of antibiotics    -monitor abdominal exam, temperature, white blood cell count    -low residue diet    -outpatient colonoscopy along with EGD in 6-8 weeks (patient also reports nocturnal episodes of chest tightness which may represent GERD);  I suggested patient can trial over-the-counter Pepcid to take 30 minutes before bedtime in the meantime

## 2022-02-18 VITALS
WEIGHT: 105 LBS | SYSTOLIC BLOOD PRESSURE: 141 MMHG | HEART RATE: 80 BPM | RESPIRATION RATE: 16 BRPM | BODY MASS INDEX: 21.17 KG/M2 | HEIGHT: 59 IN | DIASTOLIC BLOOD PRESSURE: 60 MMHG | TEMPERATURE: 98.5 F | OXYGEN SATURATION: 92 %

## 2022-02-18 LAB
ALBUMIN SERPL BCP-MCNC: 2.6 G/DL (ref 3.5–5)
ALP SERPL-CCNC: 55 U/L (ref 46–116)
ALT SERPL W P-5'-P-CCNC: 22 U/L (ref 12–78)
ANION GAP SERPL CALCULATED.3IONS-SCNC: 7 MMOL/L (ref 4–13)
AST SERPL W P-5'-P-CCNC: 18 U/L (ref 5–45)
BACTERIA BLD CULT: NORMAL
BACTERIA BLD CULT: NORMAL
BASOPHILS # BLD MANUAL: 0.1 THOUSAND/UL (ref 0–0.1)
BASOPHILS NFR MAR MANUAL: 1 % (ref 0–1)
BILIRUB SERPL-MCNC: 0.34 MG/DL (ref 0.2–1)
BUN SERPL-MCNC: 7 MG/DL (ref 5–25)
CALCIUM ALBUM COR SERPL-MCNC: 9.5 MG/DL (ref 8.3–10.1)
CALCIUM SERPL-MCNC: 8.4 MG/DL (ref 8.3–10.1)
CHLORIDE SERPL-SCNC: 104 MMOL/L (ref 100–108)
CO2 SERPL-SCNC: 25 MMOL/L (ref 21–32)
CREAT SERPL-MCNC: 0.55 MG/DL (ref 0.6–1.3)
EOSINOPHIL # BLD MANUAL: 0.41 THOUSAND/UL (ref 0–0.4)
EOSINOPHIL NFR BLD MANUAL: 4 % (ref 0–6)
ERYTHROCYTE [DISTWIDTH] IN BLOOD BY AUTOMATED COUNT: 12.6 % (ref 11.6–15.1)
GFR SERPL CREATININE-BSD FRML MDRD: 98 ML/MIN/1.73SQ M
GLUCOSE SERPL-MCNC: 100 MG/DL (ref 65–140)
HCT VFR BLD AUTO: 38.5 % (ref 34.8–46.1)
HGB BLD-MCNC: 12.8 G/DL (ref 11.5–15.4)
LYMPHOCYTES # BLD AUTO: 4.74 THOUSAND/UL (ref 0.6–4.47)
LYMPHOCYTES # BLD AUTO: 46 % (ref 14–44)
MAGNESIUM SERPL-MCNC: 2.2 MG/DL (ref 1.6–2.6)
MCH RBC QN AUTO: 29.2 PG (ref 26.8–34.3)
MCHC RBC AUTO-ENTMCNC: 33.2 G/DL (ref 31.4–37.4)
MCV RBC AUTO: 88 FL (ref 82–98)
METAMYELOCYTES NFR BLD MANUAL: 7 % (ref 0–1)
MONOCYTES # BLD AUTO: 0.72 THOUSAND/UL (ref 0–1.22)
MONOCYTES NFR BLD: 7 % (ref 4–12)
NEUTROPHILS # BLD MANUAL: 3.61 THOUSAND/UL (ref 1.85–7.62)
NEUTS BAND NFR BLD MANUAL: 8 % (ref 0–8)
NEUTS SEG NFR BLD AUTO: 27 % (ref 43–75)
PLATELET # BLD AUTO: 236 THOUSANDS/UL (ref 149–390)
PLATELET BLD QL SMEAR: ADEQUATE
PMV BLD AUTO: 9.2 FL (ref 8.9–12.7)
POTASSIUM SERPL-SCNC: 3.8 MMOL/L (ref 3.5–5.3)
PROT SERPL-MCNC: 6.1 G/DL (ref 6.4–8.2)
RBC # BLD AUTO: 4.39 MILLION/UL (ref 3.81–5.12)
RBC MORPH BLD: NORMAL
SODIUM SERPL-SCNC: 136 MMOL/L (ref 136–145)
WBC # BLD AUTO: 10.3 THOUSAND/UL (ref 4.31–10.16)

## 2022-02-18 PROCEDURE — 83735 ASSAY OF MAGNESIUM: CPT | Performed by: INTERNAL MEDICINE

## 2022-02-18 PROCEDURE — 80053 COMPREHEN METABOLIC PANEL: CPT | Performed by: INTERNAL MEDICINE

## 2022-02-18 PROCEDURE — 85027 COMPLETE CBC AUTOMATED: CPT | Performed by: INTERNAL MEDICINE

## 2022-02-18 PROCEDURE — 85007 BL SMEAR W/DIFF WBC COUNT: CPT | Performed by: INTERNAL MEDICINE

## 2022-02-18 PROCEDURE — 99239 HOSP IP/OBS DSCHRG MGMT >30: CPT | Performed by: INTERNAL MEDICINE

## 2022-02-18 RX ORDER — CEPHALEXIN 500 MG/1
500 CAPSULE ORAL EVERY 6 HOURS SCHEDULED
Qty: 28 CAPSULE | Refills: 0 | Status: SHIPPED | OUTPATIENT
Start: 2022-02-18 | End: 2022-02-25

## 2022-02-18 RX ORDER — SENNOSIDES 8.6 MG
8.6 TABLET ORAL
Qty: 30 TABLET | Refills: 0 | Status: SHIPPED | OUTPATIENT
Start: 2022-02-18

## 2022-02-18 RX ORDER — LEVOTHYROXINE SODIUM 0.05 MG/1
50 TABLET ORAL DAILY
Qty: 30 TABLET | Refills: 0 | Status: SHIPPED | OUTPATIENT
Start: 2022-02-19

## 2022-02-18 RX ORDER — DICYCLOMINE HYDROCHLORIDE 10 MG/1
10 CAPSULE ORAL 3 TIMES DAILY PRN
Qty: 30 CAPSULE | Refills: 0 | Status: SHIPPED | OUTPATIENT
Start: 2022-02-18

## 2022-02-18 RX ORDER — METRONIDAZOLE 500 MG/1
500 TABLET ORAL EVERY 8 HOURS SCHEDULED
Qty: 21 TABLET | Refills: 0 | Status: SHIPPED | OUTPATIENT
Start: 2022-02-18 | End: 2022-02-25

## 2022-02-18 RX ORDER — CEPHALEXIN 500 MG/1
500 CAPSULE ORAL EVERY 6 HOURS SCHEDULED
Status: DISCONTINUED | OUTPATIENT
Start: 2022-02-18 | End: 2022-02-18 | Stop reason: HOSPADM

## 2022-02-18 RX ADMIN — PIPERACILLIN AND TAZOBACTAM 3.38 G: 36; 4.5 INJECTION, POWDER, FOR SOLUTION INTRAVENOUS at 10:56

## 2022-02-18 RX ADMIN — PIPERACILLIN AND TAZOBACTAM 3.38 G: 36; 4.5 INJECTION, POWDER, FOR SOLUTION INTRAVENOUS at 05:30

## 2022-02-18 RX ADMIN — HEPARIN SODIUM 5000 UNITS: 5000 INJECTION INTRAVENOUS; SUBCUTANEOUS at 05:30

## 2022-02-18 RX ADMIN — LEVOTHYROXINE SODIUM 50 MCG: 50 TABLET ORAL at 08:14

## 2022-02-18 RX ADMIN — CEPHALEXIN 500 MG: 500 CAPSULE ORAL at 12:26

## 2022-02-18 RX ADMIN — VENLAFAXINE HYDROCHLORIDE 225 MG: 37.5 CAPSULE, EXTENDED RELEASE ORAL at 08:14

## 2022-02-18 NOTE — ASSESSMENT & PLAN NOTE
Patient has history of constipation  Today, patient was also found to have hypothermia  TSH is mildly elevated   free T4/t3 wnl   As per discussion with the patient, she is compliant with her levothyroxine medication and takes itat the right time before breakfast   With symptoms of hypothyroidism, patient's dose of levothyroxine increased from 25 to 50 mcg once a day    PCP to follow up

## 2022-02-18 NOTE — ASSESSMENT & PLAN NOTE
Per discussion with the patient, she is on venlafaxine as well as Ativan p r n  [place] venlafaxine 225 mg once a day  Continue these medications

## 2022-02-18 NOTE — PROGRESS NOTES
[facility]  Progress Note - Melani Peñaloza 1955, 73 y o  female MRN: [id number]  Unit/Bed#: S -Amy Encounter: [id number]  Primary Care Provider: Anh Pereira MD   Date and time admitted to hospital: 2/13/2022  7:15 AM    LEVY (acute kidney injury) [facility])  Assessment & Plan  Noted on am labs; likely d/t sepsis/dehydration d/t diarrhea  Check UA   dc ARB (received on 2/14)  Creatinine improving       Anxiety  Assessment & Plan  Per discussion with the patient, she is on venlafaxine as well as Ativan p r n  [employer] venlafaxine 225 mg once a day  Continue these medications  Primary hypertension  Assessment & Plan  Blood pressure in the emergency room, initially high, but has gone down spontaneously  hold blood pressure meds for now given LEVY and relative hypotension   bP 159/75    Acquired hypothyroidism  Assessment & Plan  Patient has history of constipation  Today, patient was also found to have hypothermia  TSH is mildly elevated   free T4/t3 wnl   As per discussion with the patient, she is compliant with her levothyroxine medication and takes itat the right time before breakfast   With symptoms of hypothyroidism, patient's dose of levothyroxine increased from 25 to 50 mcg once a day  Colitis  Assessment & Plan  History of constipation and had pellet stools yesterday  Patient came in due to abdominal pains at the suprapubic and left lower quadrant areas  Patient had diarrhea and nausea and vomiting here in the emergency room  No fever or chills  However patient was found to be hypothermic in the emergency room  Viral versus bacterial     enteric PCR, C difficile PCR/EIA:  Negative   Cont zosyn d3  If no improvement, consider GI consult as an inpatient  Pain control p r n  [employer] Patient was also ordered with Bentyl p r n  For abdominal cramping  Please see plans under sepsis      * Sepsis [facility])  Assessment & Plan  Present on admission with hypothermia at 94 1° F, leukocytosis at 17,000  Elevated lactic acid level  Source:  Colitis; possible viral versus bacterial   On sepsis order set/protocol  cont IV Zosyn    Procalcitonin: 0 05-->36  BCx: NGTD  If no further evidence of a bacterial infection, consider discontinuing antibiotic  VTE Pharmacologic Prophylaxis: VTE Score: 4 Moderate Risk (Score 3-4) - Pharmacological DVT Prophylaxis Ordered: heparin  Patient Centered Rounds: I performed bedside rounds with nursing staff today  Discussions with Specialists or Other Care Team Provider: discussed with nursing  Education and Discussions with Family / Patient: will call   Time Spent for Care: 30 minutes  More than 50% of total time spent on counseling and coordination of care as described above  Current Length of Stay: 4 day(s)  Current Patient Status: Inpatient   Certification Statement: The patient will continue to require additional inpatient hospital stay due to hypokalemia, ongoing diarrhea  Discharge Plan: Anticipate discharge tomorrow to home  Code Status: Level 1 - Full Code    Subjective:   Patient seen and examined   Feeling "good"  But "going every hour"    Objective:     Vitals:   Temp (24hrs), Av 6 °F (37 °C), Min:98 1 °F (36 7 °C), Max:99 4 °F (37 4 °C)    Temp:  [98 1 °F (36 7 °C)-99 4 °F (37 4 °C)] 99 4 °F (37 4 °C)  HR:  [62-79] 79  Resp:  [16-18] 16  BP: (138-159)/(65-85) 159/75  SpO2:  [91 %-97 %] 95 %  Body mass index is 21 21 kg/m²  Input and Output Summary (last 24 hours): Intake/Output Summary (Last 24 hours) at 2022 1903  Last data filed at 2022 1853  Gross per 24 hour   Intake 3730 ml   Output 3550 ml   Net 180 ml       Physical Exam:   Physical Exam  Constitutional:       General: She is not in acute distress  Appearance: She is not toxic-appearing or diaphoretic  HENT:      Nose: Nose normal    Eyes:      Pupils: Pupils are equal, round, and reactive to light     Cardiovascular:      Rate and Rhythm: Normal rate  Pulses: Normal pulses  Heart sounds: No murmur heard  No friction rub  No gallop  Pulmonary:      Effort: Pulmonary effort is normal  No respiratory distress  Breath sounds: No stridor  No wheezing, rhonchi or rales  Abdominal:      General: There is no distension  Palpations: There is no mass  Tenderness: There is no abdominal tenderness  There is no right CVA tenderness, left CVA tenderness, guarding or rebound  Hernia: No hernia is present  Skin:     Capillary Refill: Capillary refill takes less than 2 seconds  Coloration: Skin is pale  Skin is not jaundiced  Findings: No bruising, erythema, lesion or rash  Neurological:      General: No focal deficit present  Mental Status: She is alert  Cranial Nerves: No cranial nerve deficit  Sensory: No sensory deficit  Motor: No weakness  Coordination: Coordination normal       Gait: Gait normal       Deep Tendon Reflexes: Reflexes normal    Psychiatric:         Mood and Affect: Mood normal           Additional Data:     Labs:  Results from last 7 days   Lab Units 02/17/22  0503 02/16/22  0622 02/16/22  0622 02/15/22  1203 02/14/22  0417   WBC Thousand/uL 7 54   < > 7 20   < > 16 91*   HEMOGLOBIN g/dL 11 0*   < > 11 7   < > 17 6*   HEMATOCRIT % 33 4*   < > 34 7*   < > 53 8*   PLATELETS Thousands/uL 187   < > 179   < > 308   BANDS PCT %  --   --  12*   < >  --    NEUTROS PCT %  --   --   --   --  65   LYMPHS PCT %  --   --   --   --  29   LYMPHO PCT % 20   < > 21   < >  --    MONOS PCT %  --   --   --   --  5   MONO PCT % 19*   < > 5   < >  --    EOS PCT % 2   < > 1   < > 0    < > = values in this interval not displayed       Results from last 7 days   Lab Units 02/17/22  0503 02/14/22  0417 02/13/22  0754   SODIUM mmol/L 139   < > 135*   POTASSIUM mmol/L 3 0*   < > 3 9   CHLORIDE mmol/L 107   < > 101   CO2 mmol/L 26   < > 21   BUN mg/dL 4*   < > 19   CREATININE mg/dL 0 39*   < > 0  71   ANION GAP mmol/L 6   < > 13   CALCIUM mg/dL 7 6*   < > 10 0   ALBUMIN g/dL  --   --  4 3   TOTAL BILIRUBIN mg/dL  --   --  0 69   ALK PHOS U/L  --   --  102   ALT U/L  --   --  21   AST U/L  --   --  24   GLUCOSE RANDOM mg/dL 94   < > 191*    < > = values in this interval not displayed  Results from last 7 days   Lab Units 02/13/22  0827   INR  0 85             Results from last 7 days   Lab Units 02/14/22  0500 02/14/22  0305 02/14/22  0108 02/13/22  2310 02/13/22  2034 02/13/22  1052 02/13/22  0827   LACTIC ACID mmol/L  --  3 3* 4 3* 3 7* 3 3*   < >  --    PROCALCITONIN ng/ml 36 79*  --   --   --   --   --  <0 05    < > = values in this interval not displayed  Lines/Drains:  Invasive Devices  Report    Peripheral Intravenous Line            Peripheral IV 02/17/22 Left Wrist <1 day                      Imaging: No pertinent imaging reviewed  Recent Cultures (last 7 days):   Results from last 7 days   Lab Units 02/13/22  1311 02/13/22  0822   BLOOD CULTURE   --  No Growth After 4 Days  No Growth After 4 Days     C DIFF TOXIN B BY PCR  Negative  --        Last 24 Hours Medication List:   Current Facility-Administered Medications   Medication Dose Route Frequency Provider Last Rate    acetaminophen  650 mg Oral Q6H PRN Suman Cardoza MD      aspirin  81 mg Oral Once per day on Mon Wed Fri Anyi Wilson MD      calcium carbonate  1 tablet Oral BID With Meals Suman Cardoza MD      cholecalciferol  400 Units Oral Daily Perfecto Hernandez MD      co-enzyme Q-10  100 mg Oral Daily Perfecto Hernandez MD      cyanocobalamin  100 mcg Oral Daily Perfecto Hernandez MD      dicyclomine  10 mg Oral TID PRN Perfecto Hernandez MD      heparin (porcine)  5,000 Units Subcutaneous [facility] Shalonda Barajas MD      hydrALAZINE  5 mg Intravenous Q6H PRN Suman Cardoza MD      HYDROmorphone  0 2 mg Intravenous Q3H PRN Suman Latoya Eng MD      levothyroxine  50 mcg Oral Daily Gay Hernandez MD      LORazepam  0 5 mg Oral Q12H PRN Gay Hernandez MD      multivitamin stress formula  1 tablet Oral Daily Gay Hernandez MD      ondansetron  4 mg Intravenous Q6H PRN Gay Hernandez MD      piperacillin-tazobactam  3 375 g Intravenous Q6H Suman Eng  mL/hr at 02/17/22 1834    senna  1 tablet Oral HS PRN Gay Hernandez MD      thiamine  100 mg Oral Daily Suman Eng MD      venlafaxine  225 mg Oral Daily Marta Mccracken MD          Today, Patient Was Seen By: Marta Mccracken MD    **Please Note: This note may have been constructed using a voice recognition system  **

## 2022-02-18 NOTE — ASSESSMENT & PLAN NOTE
Per discussion with the patient, she is on venlafaxine as well as Ativan p r n  [place] venlafaxine 225 mg once a day  Continue these medications

## 2022-02-18 NOTE — ASSESSMENT & PLAN NOTE
History of constipation and had pellet stools yesterday  Patient came in due to abdominal pains at the suprapubic and left lower quadrant areas  Patient had diarrhea and nausea and vomiting here in the emergency room  No fever or chills  However patient was found to be hypothermic in the emergency room     Viral versus bacterial     enteric PCR, C difficile PCR/EIA:  Negative   DC on Keflex and flagyl  Discussed DC plan with GI and they are in agreement with DC on oral antibiotics and follow up with them for C-scope and EGD   DC on bentyl as well

## 2022-02-18 NOTE — DISCHARGE INSTR - AVS FIRST PAGE
Dear Karen Ravi,     It was our pleasure to care for you here at [facility]  It is our hope that we were always able to exceed the expected standards for your care during your stay  You were hospitalized due to colitis abdominal pain   You were cared for on the 4th floor under the service of Jacob Melo MD with the [facility] who covers for your primary care physician (PCP), Cherylene Fish, MD, while you were hospitalized  If you have any questions or concerns related to this hospitalization, you may contact us at [phone]  For follow up as well as medication refills, we recommend that you follow up with your primary care physician  A registered nurse will reach out to you by phone within a few days after your discharge to answer any additional questions that you may have after going home  However, at this time we provide for you here, the most important instructions / recommendations at discharge:     Notable Medication Adjustments -   We discharged you on Keflex 500 mg to be taken 4 times a day and Flagyl to be taken 3 times a day 500 mg  Please take 7 additional days to complete a total of 10 days  If you experience worsening abdominal pain diarrhea bloody stools please call your GI doctor PCP or urgently    If you are unable to reach them or is after hours come into the emergency department  Testing Required after Discharge -   You will need a colonoscopy and an EGD in a couple months once your inflammation/infection has subsided  Important follow up information -   You must follow-up with Gastroenterology failure to follow-up may result in a delayed diagnosis and subsequent delayed treatment/management of an underlying health condition  Please follow-up with the PCP to monitor electrolytes and complete blood count in about a week  Other Instructions -   None  Please review this entire after visit summary as additional general instructions including medication list, appointments, activity, diet, any pertinent wound care, and other additional recommendations from your care team that may be provided for you        Sincerely,     Joseph Frias MD

## 2022-02-18 NOTE — ASSESSMENT & PLAN NOTE
History of constipation and had pellet stools yesterday  Patient came in due to abdominal pains at the suprapubic and left lower quadrant areas  Patient had diarrhea and nausea and vomiting here in the emergency room  No fever or chills  However patient was found to be hypothermic in the emergency room  Viral versus bacterial     enteric PCR, C difficile PCR/EIA:  Negative   Cont zosyn d3  If no improvement, consider GI consult as an inpatient  Pain control p r n  Fawn Lyle Patient was also ordered with Bentyl p r reed  For abdominal cramping  Please see plans under sepsis

## 2022-02-18 NOTE — ASSESSMENT & PLAN NOTE
Blood pressure in the emergency room, initially high, but has gone down spontaneously    hold blood pressure meds for now given LEVY and relative hypotension   bP 159/75

## 2022-02-18 NOTE — DISCHARGE SUMMARY
[facility]  Discharge- Chaparro Prophet 1955, 73 y o  female MRN: [id number]  Unit/Bed#: S -63 Encounter: [id number]  Primary Care Provider: Meghan Henry MD   Date and time admitted to hospital: 2/13/2022  7:15 AM    LEVY (acute kidney injury) [facility])  Assessment & Plan  Resolved and now back to baseline         Anxiety  Assessment & Plan  Per discussion with the patient, she is on venlafaxine as well as Ativan p r n  Gerhardt Sep venlafaxine 225 mg once a day  Continue these medications  Primary hypertension  Assessment & Plan     systolic   Renal function is stable and will restart all home meds         Acquired hypothyroidism  Assessment & Plan  Patient has history of constipation  Today, patient was also found to have hypothermia  TSH is mildly elevated   free T4/t3 wnl   As per discussion with the patient, she is compliant with her levothyroxine medication and takes itat the right time before breakfast   With symptoms of hypothyroidism, patient's dose of levothyroxine increased from 25 to 50 mcg once a day  PCP to follow up        Colitis  Assessment & Plan  History of constipation and had pellet stools yesterday  Patient came in due to abdominal pains at the suprapubic and left lower quadrant areas  Patient had diarrhea and nausea and vomiting here in the emergency room  No fever or chills  However patient was found to be hypothermic in the emergency room  Viral versus bacterial     enteric PCR, C difficile PCR/EIA:  Negative   DC on Keflex and flagyl  Discussed DC plan with GI and they are in agreement with DC on oral antibiotics and follow up with them for C-scope and EGD   DC on bentyl as well     * Sepsis ([facility] [address] )  Assessment & Plan  Present on admission with hypothermia at 94 1° F, leukocytosis at 17,000  Elevated lactic acid level  Source:  Colitis; possible viral versus bacterial   On sepsis order set/protocol    cont IV Zosyn    Procalcitonin: 0 05-->36  BCx: NGTD  If no further evidence of a bacterial infection, consider discontinuing antibiotic  Medical Problems             Resolved Problems  Date Reviewed: 2/18/2022    None              Discharging Physician / Practitioner: Yamile Gutierres MD  PCP: Shaun Duong MD  Admission Date:   Admission Orders (From admission, onward)     Ordered        02/13/22 1050  Inpatient Admission  Once                      Discharge Date: 02/18/22    Consultations During Hospital Stay:  · Gastroenterology    Procedures Performed:   · CT chest abdomen pelvis-   · "Pancolitis, most likely infectious   The descending and sigmoid colon are most severely affected with marked adjacent mesenteric edema   No pneumatosis identified  "    Significant Findings / Test Results:   · Does need follow-up colonoscopy in EGD in the near future  Recommendation was in a month or 2 to follow up with GI for colonoscopy an EGD    Incidental Findings:   · None     Test Results Pending at Discharge (will require follow up): · None     Outpatient Tests Requested:  · Should have CMP and CBC done within a week by PCP    Complications:  None    Reason for Admission:  Abdominal pain and diarrhea    Hospital Course:   Carole Flowers is a 73 y o  female patient who originally presented to the hospital on 2/13/2022 due to abdominal pain and diarrhea  Patient required IV fluids and IV antibiotics for the 1st 48 hours  Yesterday she was feeling slightly better with regard to pain however continue to have loose bowel movements every hour and was still requiring IV fluids  Given the IV fluids have now been on hold and her renal function and electrolytes are normal she is stable for discharge she has only had 2 or 3 loose stool since 3:00 a m  This morning      She will need close follow-up with GI for colonoscopy an EGD    We are discharging her on Keflex and Flagyl which was discussed with Gastroenterology      Please see above list of diagnoses and related plan for additional information  Condition at Discharge: stable    Discharge Day Visit / Exam:   Subjective:    Patient seen and examined  No new symptoms   Feeling" back to normal"  Vitals: Blood Pressure: 141/60 (02/18/22 0638)  Pulse: 80 (02/18/22 0638)  Temperature: 98 5 °F (36 9 °C) (02/18/22 0638)  Temp Source: Oral (02/18/22 7079)  Respirations: 16 (02/18/22 0258)  Height: 4' 11" (149 9 cm) (02/13/22 0719)  Weight - Scale: 47 6 kg (105 lb) (02/13/22 0719)  SpO2: 92 % (02/18/22 1916)  Exam:   Physical Exam  Constitutional:       General: She is not in acute distress  Appearance: She is not ill-appearing, toxic-appearing or diaphoretic  HENT:      Head: Normocephalic  Nose: Nose normal       Mouth/Throat:      Mouth: Mucous membranes are moist    Eyes:      General: No scleral icterus  Right eye: No discharge  Pupils: Pupils are equal, round, and reactive to light  Cardiovascular:      Rate and Rhythm: Normal rate  Pulmonary:      Breath sounds: No stridor  No wheezing, rhonchi or rales  Chest:      Chest wall: No tenderness  Abdominal:      General: Abdomen is flat  There is no distension  Palpations: There is no mass  Tenderness: There is no abdominal tenderness  There is no right CVA tenderness, guarding or rebound  Hernia: No hernia is present  Musculoskeletal:         General: No swelling, tenderness or signs of injury  Right lower leg: No edema  Left lower leg: No edema  Skin:     Capillary Refill: Capillary refill takes less than 2 seconds  Coloration: Skin is pale  Skin is not jaundiced  Findings: No bruising, erythema or lesion  Neurological:      General: No focal deficit present  Mental Status: She is alert  Cranial Nerves: No cranial nerve deficit  Motor: No weakness        Coordination: Coordination normal       Gait: Gait normal    Psychiatric:         Mood and Affect: Mood normal           Discussion with Family: Updated  () via phone  Discharge instructions/Information to patient and family:   See after visit summary for information provided to patient and family  Provisions for Follow-Up Care:  See after visit summary for information related to follow-up care and any pertinent home health orders  Disposition:   Home    Planned Readmission:  None     Discharge Statement:  I spent 45 minutes discharging the patient  This time was spent on the day of discharge  I had direct contact with the patient on the day of discharge  Greater than 50% of the total time was spent examining patient, answering all patient questions, arranging and discussing plan of care with patient as well as directly providing post-discharge instructions  Additional time then spent on discharge activities  Discharge Medications:  See after visit summary for reconciled discharge medications provided to patient and/or family        **Please Note: This note may have been constructed using a voice recognition system**

## 2022-02-18 NOTE — ASSESSMENT & PLAN NOTE
Present on admission with hypothermia at 94 1° F, leukocytosis at 17,000  Elevated lactic acid level  Source:  Colitis; possible viral versus bacterial   On sepsis order set/protocol  cont IV Zosyn    Procalcitonin: 0 05-->36  BCx: NGTD  If no further evidence of a bacterial infection, consider discontinuing antibiotic

## 2022-02-23 NOTE — UTILIZATION REVIEW
Initial Clinical Review    Admission: Date/Time/Statement:   Admission Orders (From admission, onward)     Ordered        02/13/22 1050  Inpatient Admission  Once                      Orders Placed This Encounter   Procedures    Inpatient Admission     Standing Status:   Standing     Number of Occurrences:   1     Order Specific Question:   Level of Care     Answer:   Med Surg [16]     Order Specific Question:   Estimated length of stay     Answer:   More than 2 Midnights     Order Specific Question:   Certification     Answer:   I certify that inpatient services are medically necessary for this patient for a duration of greater than two midnights  See H&P and MD Progress Notes for additional information about the patient's course of treatment  ED Arrival Information     Expected Arrival Acuity    - 2/13/2022 07:14 Emergent         Means of arrival Escorted by Service Admission type    Ambulance [facility] Urgent         Arrival complaint    abd pain        Chief Complaint   Patient presents with    Abdominal Pain     Right sided abdominal pain that started this AM  Per pt, "I feel like I have to have a BM " Found in bathroom by  who called EMS  Per pt, "I drank too much wine last night " Pt reports drinking 3 glasses of wine last night  Initial Presentation: 74 yo female to ED from home via EMS w/ abd pain   Started w/ abd pain this am   pain to be excruciating, constant and cramping in character  Episode of N/V and diarrhea   Hypothermic in ED temp 94 1 , wbc 17k , elevated lactic acid   Stool sent for PCR , c-diff and EIA   Admitted IP status w/ sepsis and colitis plan for iv zosyn , f/u BC , IVF , lactic acid q2hr until normal , cont usha hugger as needed  Pain control prn , bentyl for abd cramping   HTN cont BP meds  PE: mm dry , abd tenderness , direct tenderness LLQ , suprapubic area          ED Triage Vitals   Temperature Pulse Respirations Blood Pressure SpO2   02/13/22 0731 02/13/22 0719 02/13/22 0719 02/13/22 0719 02/13/22 0719   (!) 94 1 °F (34 5 °C) 65 18 (!) 197/117 100 %      Temp Source Heart Rate Source Patient Position - Orthostatic VS BP Location FiO2 (%)   02/13/22 0731 02/13/22 0719 02/13/22 0719 02/13/22 0719 --   Rectal Monitor Lying Right arm       Pain Score       02/13/22 0719       10 - Worst Possible Pain          Wt Readings from Last 1 Encounters:   02/13/22 47 6 kg (105 lb)     Additional Vital Signs:   02/14/22 0856 97 4 °F (36 3 °C) Abnormal  98 18 123/81 97 96 % None (Room air) Lying   02/14/22 0700 -- 100 16 132/81 100 95 % -- Sitting   02/14/22 0200 97 6 °F (36 4 °C) 96 16 122/86 100 95 % None (Room air) Lying   02/14/22 0130 -- 86 16 120/91 102 98 % None (Room air) Lying   02/13/22 2233 -- 103 18 110/74 86 96 % None (Room air) Lying   02/13/22 1845 98 1 °F (36 7 °C) -- -- -- -- -- -- --   02/13/22 1548 96 5 °F (35 8 °C) Abnormal  -- -- -- -- -- -- --   02/13/22 1440 95 6 °F (35 3 °C) Abnormal  110 Abnormal  18 129/84 101 95 % None (Room air) Lying   02/13/22 1045 95 4 °F (35 2 °C) Abnormal  90 18 144/88 112 100 % None (Room air) Lying   02/13/22 0800 -- 68 18 160/72 103 -- -- Lying   02/13/22 0735 -- 64 -- 184/88 Abnormal  126 100 % None (Room air) Lying   02/13/22 0731 94 1 °F (34 5 °C) Abnormal  -- -- -- -- -- None (Room air)        Pertinent Labs/Diagnostic Test Results:   2/13 CT abd   Pancolitis, most likely infectious  The descending and sigmoid colon are most severely affected with marked adjacent mesenteric edema    No pneumatosis identified        · 2/13 EKG NSR      Results from last 7 days   Lab Units 02/18/22  0321 02/17/22  0503   WBC Thousand/uL 10 30* 7 54   HEMOGLOBIN g/dL 12 8 11 0*   HEMATOCRIT % 38 5 33 4*   PLATELETS Thousands/uL 236 187   BANDS PCT % 8  --      Results from last 7 days   Lab Units 02/18/22  0321 02/17/22  0503   SODIUM mmol/L 136 139   POTASSIUM mmol/L 3 8 3 0*   CHLORIDE mmol/L 104 107   CO2 mmol/L 25 26   ANION GAP mmol/L 7 6   BUN mg/dL 7 4*   CREATININE mg/dL 0 55* 0 39*   EGFR ml/min/1 73sq m 98 110   CALCIUM mg/dL 8 4 7 6*   MAGNESIUM mg/dL 2 2  --      Results from last 7 days   Lab Units 02/18/22  0321   AST U/L 18   ALT U/L 22   ALK PHOS U/L 55   TOTAL PROTEIN g/dL 6 1*   ALBUMIN g/dL 2 6*   TOTAL BILIRUBIN mg/dL 0 34     Results from last 7 days   Lab Units 02/18/22  0321 02/17/22  0503   GLUCOSE RANDOM mg/dL 100 94                                 ED Treatment:   Medication Administration from 02/13/2022 0714 to 02/14/2022 0856       Date/Time Order Dose Route Action     02/13/2022 0754 ondansetron (ZOFRAN) injection 4 mg 4 mg Intravenous Given     02/13/2022 0758 sodium chloride 0 9 % bolus 1,000 mL 1,000 mL Intravenous New Bag     02/13/2022 0853 vancomycin (VANCOCIN) IVPB (premix in dextrose) 1,000 mg 200 mL 1,000 mg Intravenous New Bag     02/13/2022 0843 piperacillin-tazobactam (ZOSYN) 3 375 g in sodium chloride 0 9 % 100 mL IVPB 3 375 g Intravenous New Bag     02/13/2022 0829 sodium chloride 0 9 % bolus 1,000 mL 1,000 mL Intravenous New Bag     02/13/2022 1056 ondansetron (ZOFRAN) injection 4 mg 4 mg Intravenous Given     02/13/2022 1056 morphine (PF) 4 mg/mL injection 4 mg 4 mg Intravenous Given     02/13/2022 1845 multi-electrolyte (PLASMALYTE-A/ISOLYTE-S PH 7 4) IV solution 200 mL/hr Intravenous Rate/Dose Change     02/13/2022 1445 multi-electrolyte (PLASMALYTE-A/ISOLYTE-S PH 7 4) IV solution 150 mL/hr Intravenous New Bag     02/14/2022 0433 piperacillin-tazobactam (ZOSYN) 3 375 g in sodium chloride 0 9 % 100 mL IVPB 3 375 g Intravenous New Bag     02/13/2022 2317 piperacillin-tazobactam (ZOSYN) 3 375 g in sodium chloride 0 9 % 100 mL IVPB 3 375 g Intravenous New Bag     02/13/2022 1651 piperacillin-tazobactam (ZOSYN) 3 375 g in sodium chloride 0 9 % 100 mL IVPB 3 375 g Intravenous New Bag        Past Medical History:   Diagnosis Date    Asthma     Disease of thyroid gland     Hypertension  Psychiatric disorder      Present on Admission:   Sepsis ([address] )   Colitis   Acquired hypothyroidism   Primary hypertension   Anxiety      Admitting Diagnosis: Pancolitis (HCC) [K51 00]  Abdominal pain [R10 9]  Age/Sex: 73 y o  female  Admission Orders:  Scheduled Medications:  aspirin, 81 mg, Oral, Once per day on Mon Wed Fri  calcium carbonate, 1 tablet, Oral, BID With Meals  cholecalciferol, 400 Units, Oral, Daily  co-enzyme Q-10, 100 mg, Oral, Daily  cyanocobalamin, 100 mcg, Oral, Daily  enoxaparin, 40 mg, Subcutaneous, Daily  levothyroxine, 50 mcg, Oral, Daily  multivitamin stress formula, 1 tablet, Oral, Daily  piperacillin-tazobactam, 3 375 g, Intravenous, Q6H  thiamine, 100 mg, Oral, Daily  venlafaxine, 225 mg, Oral, Daily      Continuous IV Infusions:  multi-electrolyte, 200 mL/hr, Intravenous, Continuous      PRN Meds:  acetaminophen, 650 mg, Oral, Q6H PRN  dicyclomine, 10 mg, Oral, TID PRN  hydrALAZINE, 5 mg, Intravenous, Q6H PRN  HYDROmorphone, 0 2 mg, Intravenous, Q3H PRN  2/14 x1  LORazepam, 0 5 mg, Oral, Q12H PRN  ondansetron, 4 mg, Intravenous, Q6H PRN  senna, 1 tablet, Oral, HS PRN    I&O   Clear liq diet         Network Utilization Review Department  ATTENTION: Please call with any questions or concerns to [phone] and carefully listen to the prompts so that you are directed to the right person  All voicemails are confidential   Kory Terry all requests for admission clinical reviews, approved or denied determinations and any other requests to dedicated fax number below belonging to the campus where the patient is receiving treatment   List of dedicated fax numbers for the Facilities:  [address] DENIALS (Administrative/Medical Necessity) [phone]   [address] [place] [address] (Maternity/NICU/Pediatrics) 261 Health system,[address] Floor [address]  [address] [facility] [place] [phone] [id number] [place] [address] [address] [place] [id number] [phone]

## 2022-04-20 ENCOUNTER — APPOINTMENT (OUTPATIENT)
Dept: LAB | Age: 67
End: 2022-04-20
Payer: COMMERCIAL

## 2022-04-20 ENCOUNTER — HOSPITAL ENCOUNTER (OUTPATIENT)
Dept: RADIOLOGY | Age: 67
Discharge: HOME | End: 2022-04-20
Payer: COMMERCIAL

## 2022-04-20 ENCOUNTER — NEW PATIENT (OUTPATIENT)
Dept: URBAN - METROPOLITAN AREA CLINIC 6 | Facility: CLINIC | Age: 67
End: 2022-04-20

## 2022-04-20 DIAGNOSIS — H43.813: ICD-10-CM

## 2022-04-20 DIAGNOSIS — E03.9 HYPOTHYROIDISM, UNSPECIFIED TYPE: ICD-10-CM

## 2022-04-20 DIAGNOSIS — H04.123: ICD-10-CM

## 2022-04-20 DIAGNOSIS — L40.3 SAPHO SYNDROME (HCC): ICD-10-CM

## 2022-04-20 DIAGNOSIS — M85.80 SAPHO SYNDROME (HCC): ICD-10-CM

## 2022-04-20 DIAGNOSIS — M65.9 SAPHO SYNDROME (HCC): ICD-10-CM

## 2022-04-20 DIAGNOSIS — M86.9 SAPHO SYNDROME (HCC): ICD-10-CM

## 2022-04-20 DIAGNOSIS — H02.831: ICD-10-CM

## 2022-04-20 DIAGNOSIS — E78.01 ESSENTIAL FAMILIAL HYPERCHOLESTEROLEMIA: ICD-10-CM

## 2022-04-20 DIAGNOSIS — L70.9 SAPHO SYNDROME (HCC): ICD-10-CM

## 2022-04-20 DIAGNOSIS — H02.834: ICD-10-CM

## 2022-04-20 DIAGNOSIS — H35.413: ICD-10-CM

## 2022-04-20 DIAGNOSIS — H25.13: ICD-10-CM

## 2022-04-20 LAB — TSH SERPL DL<=0.05 MIU/L-ACNC: 2.64 UIU/ML (ref 0.45–4.5)

## 2022-04-20 PROCEDURE — 99204 OFFICE O/P NEW MOD 45 MIN: CPT

## 2022-04-20 PROCEDURE — 92015 DETERMINE REFRACTIVE STATE: CPT

## 2022-04-20 PROCEDURE — 84443 ASSAY THYROID STIM HORMONE: CPT

## 2022-04-20 PROCEDURE — 77080 DXA BONE DENSITY AXIAL: CPT

## 2022-04-20 PROCEDURE — 36415 COLL VENOUS BLD VENIPUNCTURE: CPT

## 2022-04-20 PROCEDURE — 82172 ASSAY OF APOLIPOPROTEIN: CPT

## 2022-04-20 ASSESSMENT — VISUAL ACUITY
OD_CC: 20/30
OD_GLARE: 20/80
OS_GLARE: 20/100+1
OS_CC: 20/40 +/-1
OU_CC: J1

## 2022-04-20 ASSESSMENT — TONOMETRY
OD_IOP_MMHG: 15
OS_IOP_MMHG: 13

## 2022-04-21 LAB — APO B SERPL-MCNC: 99 MG/DL

## 2022-04-28 ENCOUNTER — TELEPHONE (OUTPATIENT)
Dept: GASTROENTEROLOGY | Facility: AMBULARY SURGERY CENTER | Age: 67
End: 2022-04-28

## 2022-04-28 NOTE — TELEPHONE ENCOUNTER
----- Message from Geovanna Lind PA-C sent at 2/16/2022  5:02 PM EST -----  Patient is currently hospitalized at [address] [facility], please arrange for outpatient EGD and colonoscopy in 6-8 weeks, she was seen by Dr Joey Schumacher in hospital   Thank you

## 2022-06-13 ENCOUNTER — HOSPITAL ENCOUNTER (OUTPATIENT)
Dept: NON INVASIVE DIAGNOSTICS | Facility: CLINIC | Age: 67
Discharge: HOME | End: 2022-06-13
Payer: COMMERCIAL

## 2022-06-13 DIAGNOSIS — I10 PRIMARY HYPERTENSION: ICD-10-CM

## 2022-06-13 DIAGNOSIS — E78.01 FAMILIAL HYPERCHOLESTEROLEMIA: ICD-10-CM

## 2022-06-13 DIAGNOSIS — K55.9 ISCHEMIC COLITIS (HCC): ICD-10-CM

## 2022-06-13 DIAGNOSIS — R07.89 ATYPICAL CHEST PAIN: ICD-10-CM

## 2022-06-13 LAB
CHEST PAIN STATEMENT: NORMAL
MAX DIASTOLIC BP: 92 MMHG
MAX HEART RATE: 157 BPM
MAX HR PERCENT: 101 %
MAX HR: 157 BPM
MAX PREDICTED HEART RATE: 155 BPM
MAX. SYSTOLIC BP: 160 MMHG
NUC STRESS EJECTION FRACTION: 49 %
PROTOCOL NAME: NORMAL
RATE PRESSURE PRODUCT: NORMAL
REASON FOR TERMINATION: NORMAL
SL CV REST NUCLEAR ISOTOPE DOSE: 10.9 MCI
SL CV STRESS NUCLEAR ISOTOPE DOSE: 30.4 MCI
SL CV STRESS RECOVERY BP: NORMAL MMHG
SL CV STRESS RECOVERY HR: 98 BPM
SL CV STRESS STAGE REACHED: 3
STRESS ANGINA INDEX: 0
STRESS BASELINE BP: NORMAL MMHG
STRESS BASELINE HR: 80 BPM
STRESS DUKE TREADMILL SCORE: 9
STRESS O2 SAT REST: 99 %
STRESS PEAK HR: 157 BPM
STRESS POST ESTIMATED WORKLOAD: 10.1 METS
STRESS POST EXERCISE DUR MIN: 9 MIN
STRESS POST O2 SAT PEAK: 98 %
STRESS POST PEAK BP: 160 MMHG
STRESS ST DEPRESSION: 0 MM
STRESS/REST PERFUSION RATIO: 0.99
TARGET HR FORMULA: NORMAL
TEST INDICATION: NORMAL
TIME IN EXERCISE PHASE: NORMAL

## 2022-06-13 PROCEDURE — 78452 HT MUSCLE IMAGE SPECT MULT: CPT

## 2022-06-13 PROCEDURE — 93017 CV STRESS TEST TRACING ONLY: CPT

## 2022-06-13 PROCEDURE — 93016 CV STRESS TEST SUPVJ ONLY: CPT | Performed by: INTERNAL MEDICINE

## 2022-06-13 PROCEDURE — 93018 CV STRESS TEST I&R ONLY: CPT | Performed by: INTERNAL MEDICINE

## 2022-06-13 PROCEDURE — 78452 HT MUSCLE IMAGE SPECT MULT: CPT | Performed by: INTERNAL MEDICINE

## 2022-06-13 PROCEDURE — G1004 CDSM NDSC: HCPCS

## 2022-06-13 PROCEDURE — A9502 TC99M TETROFOSMIN: HCPCS

## 2023-09-01 ENCOUNTER — ESTABLISHED COMPREHENSIVE EXAM (OUTPATIENT)
Dept: URBAN - METROPOLITAN AREA CLINIC 6 | Facility: CLINIC | Age: 68
End: 2023-09-01

## 2023-09-01 DIAGNOSIS — H04.123: ICD-10-CM

## 2023-09-01 DIAGNOSIS — H35.413: ICD-10-CM

## 2023-09-01 DIAGNOSIS — H02.834: ICD-10-CM

## 2023-09-01 DIAGNOSIS — H43.813: ICD-10-CM

## 2023-09-01 DIAGNOSIS — H25.813: ICD-10-CM

## 2023-09-01 DIAGNOSIS — H02.831: ICD-10-CM

## 2023-09-01 PROCEDURE — 92014 COMPRE OPH EXAM EST PT 1/>: CPT

## 2023-09-01 ASSESSMENT — TONOMETRY
OD_IOP_MMHG: 15
OS_IOP_MMHG: 12

## 2023-09-01 ASSESSMENT — VISUAL ACUITY
OD_CC: 20/30-1
OS_PH: 20/30+1
OS_CC: 20/50-1

## 2024-09-04 ENCOUNTER — ESTABLISHED COMPREHENSIVE EXAM (OUTPATIENT)
Dept: URBAN - METROPOLITAN AREA CLINIC 6 | Facility: CLINIC | Age: 69
End: 2024-09-04

## 2024-09-04 DIAGNOSIS — H43.813: ICD-10-CM

## 2024-09-04 DIAGNOSIS — H02.834: ICD-10-CM

## 2024-09-04 DIAGNOSIS — H35.413: ICD-10-CM

## 2024-09-04 DIAGNOSIS — H25.813: ICD-10-CM

## 2024-09-04 DIAGNOSIS — H02.831: ICD-10-CM

## 2024-09-04 DIAGNOSIS — H04.123: ICD-10-CM

## 2024-09-04 PROCEDURE — 92014 COMPRE OPH EXAM EST PT 1/>: CPT

## 2024-09-04 ASSESSMENT — VISUAL ACUITY
OU_CC: J2
OS_CC: 20/50-1
OS_PH: 20/40-2
OD_CC: 20/30-1

## 2024-09-04 ASSESSMENT — TONOMETRY
OD_IOP_MMHG: 15
OS_IOP_MMHG: 16

## 2025-02-13 ENCOUNTER — PRE-OP CATARACT MEASUREMENTS (OUTPATIENT)
Dept: URBAN - METROPOLITAN AREA CLINIC 6 | Facility: CLINIC | Age: 70
End: 2025-02-13

## 2025-02-13 DIAGNOSIS — H25.813: ICD-10-CM

## 2025-02-13 DIAGNOSIS — H04.123: ICD-10-CM

## 2025-02-13 PROCEDURE — 92012 INTRM OPH EXAM EST PATIENT: CPT

## 2025-02-13 PROCEDURE — 92136 OPHTHALMIC BIOMETRY: CPT

## 2025-02-13 ASSESSMENT — VISUAL ACUITY
OU_CC: J1
OS_CC: 20/50-2
OD_GLARE: 20/60
OS_GLARE: 20/150
OD_CC: 20/25
OS_PH: 20/30

## 2025-02-13 ASSESSMENT — KERATOMETRY
OD_AXISANGLE2_DEGREES: 61
OS_AXISANGLE_DEGREES: 004
OS_K2POWER_DIOPTERS: 47.00
OD_AXISANGLE_DEGREES: 151
OS_K1POWER_DIOPTERS: 44.00
OD_K2POWER_DIOPTERS: 46.25
OD_K1POWER_DIOPTERS: 43.75
OS_AXISANGLE2_DEGREES: 94

## 2025-02-13 ASSESSMENT — TONOMETRY
OD_IOP_MMHG: 16
OS_IOP_MMHG: 15

## 2025-06-18 ENCOUNTER — 1 DAY POST-OP (OUTPATIENT)
Dept: URBAN - METROPOLITAN AREA CLINIC 6 | Facility: CLINIC | Age: 70
End: 2025-06-18

## 2025-06-18 DIAGNOSIS — Z96.1: ICD-10-CM

## 2025-06-18 DIAGNOSIS — H25.811: ICD-10-CM

## 2025-06-18 PROCEDURE — 92136 OPHTHALMIC BIOMETRY: CPT | Mod: 26,RT

## 2025-06-18 PROCEDURE — 99024 POSTOP FOLLOW-UP VISIT: CPT

## 2025-06-18 ASSESSMENT — KERATOMETRY
OS_K1POWER_DIOPTERS: 44.00
OS_AXISANGLE2_DEGREES: 94
OD_K2POWER_DIOPTERS: 46.25
OS_AXISANGLE_DEGREES: 004
OD_K1POWER_DIOPTERS: 43.75
OD_AXISANGLE2_DEGREES: 61
OS_K2POWER_DIOPTERS: 47.00
OD_AXISANGLE_DEGREES: 151

## 2025-06-18 ASSESSMENT — VISUAL ACUITY
OD_PH: 20/40
OD_SC: 20/250
OS_SC: 20/25-1

## 2025-06-18 ASSESSMENT — TONOMETRY
OS_IOP_MMHG: 21
OD_IOP_MMHG: 18

## 2025-07-02 ENCOUNTER — POST-OP CHECK (OUTPATIENT)
Dept: URBAN - METROPOLITAN AREA CLINIC 6 | Facility: CLINIC | Age: 70
End: 2025-07-02

## 2025-07-02 DIAGNOSIS — Z96.1: ICD-10-CM

## 2025-07-02 PROCEDURE — 99024 POSTOP FOLLOW-UP VISIT: CPT

## 2025-07-02 ASSESSMENT — VISUAL ACUITY
OS_SC: 20/20
OD_CC: 20/30

## 2025-07-02 ASSESSMENT — KERATOMETRY
OD_K1POWER_DIOPTERS: 43.75
OS_AXISANGLE_DEGREES: 004
OD_K2POWER_DIOPTERS: 46.25
OD_AXISANGLE_DEGREES: 151
OD_AXISANGLE2_DEGREES: 61
OS_K1POWER_DIOPTERS: 44.00
OS_K2POWER_DIOPTERS: 47.00
OS_AXISANGLE2_DEGREES: 94

## 2025-07-02 ASSESSMENT — TONOMETRY
OD_IOP_MMHG: 15
OS_IOP_MMHG: 12

## 2025-07-23 ENCOUNTER — 1 DAY POST-OP (OUTPATIENT)
Dept: URBAN - METROPOLITAN AREA CLINIC 6 | Facility: CLINIC | Age: 70
End: 2025-07-23

## 2025-07-23 DIAGNOSIS — Z96.1: ICD-10-CM

## 2025-07-23 PROCEDURE — 99024 POSTOP FOLLOW-UP VISIT: CPT

## 2025-07-23 ASSESSMENT — KERATOMETRY
OS_K1POWER_DIOPTERS: 44.00
OD_AXISANGLE_DEGREES: 151
OS_AXISANGLE2_DEGREES: 94
OD_K1POWER_DIOPTERS: 43.75
OD_K2POWER_DIOPTERS: 46.25
OD_AXISANGLE2_DEGREES: 61
OS_K2POWER_DIOPTERS: 47.00
OS_AXISANGLE_DEGREES: 004

## 2025-07-23 ASSESSMENT — VISUAL ACUITY
OD_SC: 20/25+2
OS_SC: 20/25

## 2025-07-23 ASSESSMENT — TONOMETRY
OS_IOP_MMHG: 12
OD_IOP_MMHG: 27

## 2025-08-13 ENCOUNTER — POST-OP CHECK (OUTPATIENT)
Dept: URBAN - METROPOLITAN AREA CLINIC 6 | Facility: CLINIC | Age: 70
End: 2025-08-13

## 2025-08-13 DIAGNOSIS — H00.14: ICD-10-CM

## 2025-08-13 DIAGNOSIS — Z96.1: ICD-10-CM

## 2025-08-13 DIAGNOSIS — H02.834: ICD-10-CM

## 2025-08-13 DIAGNOSIS — H02.831: ICD-10-CM

## 2025-08-13 PROCEDURE — 99024 POSTOP FOLLOW-UP VISIT: CPT

## 2025-08-13 PROCEDURE — 92015 DETERMINE REFRACTIVE STATE: CPT

## 2025-08-13 ASSESSMENT — KERATOMETRY
OS_AXISANGLE2_DEGREES: 94
OD_K1POWER_DIOPTERS: 43.75
OS_AXISANGLE_DEGREES: 004
OD_K2POWER_DIOPTERS: 46.25
OS_K2POWER_DIOPTERS: 47.00
OD_AXISANGLE_DEGREES: 151
OD_AXISANGLE2_DEGREES: 61
OS_K1POWER_DIOPTERS: 44.00

## 2025-08-13 ASSESSMENT — TONOMETRY
OS_IOP_MMHG: 11
OD_IOP_MMHG: 13

## 2025-08-13 ASSESSMENT — VISUAL ACUITY
OS_SC: 20/25
OD_SC: 20/20

## (undated) RX ORDER — TOBRAMYCIN AND DEXAMETHASONE 1; 3 MG/ML; MG/ML: 1 SUSPENSION/ DROPS OPHTHALMIC